# Patient Record
Sex: FEMALE | Race: WHITE | Employment: FULL TIME | URBAN - METROPOLITAN AREA
[De-identification: names, ages, dates, MRNs, and addresses within clinical notes are randomized per-mention and may not be internally consistent; named-entity substitution may affect disease eponyms.]

---

## 2016-12-21 LAB
CHLAMYDIA, EXTERNAL: NEGATIVE
HBSAG, EXTERNAL: NEGATIVE
HIV, EXTERNAL: NON REACTIVE
N. GONORRHEA, EXTERNAL: NEGATIVE
RUBELLA, EXTERNAL: NORMAL
TYPE, ABO & RH, EXTERNAL: NORMAL

## 2017-05-12 LAB
ANTIBODY SCREEN, EXTERNAL: NEGATIVE
T. PALLIDUM, EXTERNAL: NEGATIVE

## 2017-06-08 ENCOUNTER — HOSPITAL ENCOUNTER (OUTPATIENT)
Dept: ULTRASOUND IMAGING | Age: 37
Discharge: HOME | End: 2017-06-08
Attending: OBSTETRICS & GYNECOLOGY
Payer: COMMERCIAL

## 2017-06-08 DIAGNOSIS — O12.00 LEG SWELLING IN PREGNANCY: ICD-10-CM

## 2017-06-08 DIAGNOSIS — M79.605 BILATERAL LEG PAIN: ICD-10-CM

## 2017-06-08 DIAGNOSIS — M79.604 BILATERAL LEG PAIN: ICD-10-CM

## 2017-06-08 PROCEDURE — 93970 EXTREMITY STUDY: CPT

## 2017-06-15 ENCOUNTER — HOSPITAL ENCOUNTER (EMERGENCY)
Age: 37
Discharge: HOME | End: 2017-06-15
Attending: OBSTETRICS & GYNECOLOGY | Admitting: OBSTETRICS & GYNECOLOGY
Payer: COMMERCIAL

## 2017-06-15 ENCOUNTER — HOSPITAL ENCOUNTER (EMERGENCY)
Age: 37
End: 2017-06-15
Attending: OBSTETRICS & GYNECOLOGY | Admitting: OBSTETRICS & GYNECOLOGY
Payer: COMMERCIAL

## 2017-06-15 VITALS
DIASTOLIC BLOOD PRESSURE: 76 MMHG | TEMPERATURE: 98.5 F | WEIGHT: 173 LBS | OXYGEN SATURATION: 97 % | SYSTOLIC BLOOD PRESSURE: 123 MMHG | BODY MASS INDEX: 28.82 KG/M2 | HEART RATE: 117 BPM | HEIGHT: 65 IN | RESPIRATION RATE: 16 BRPM

## 2017-06-15 LAB
FIBRONECTIN FETAL VAG QL: NEGATIVE
GRBS, EXTERNAL: POSITIVE

## 2017-06-15 PROCEDURE — 96360 HYDRATION IV INFUSION INIT: CPT

## 2017-06-15 PROCEDURE — 99282 EMERGENCY DEPT VISIT SF MDM: CPT

## 2017-06-15 PROCEDURE — 74011250636 HC RX REV CODE- 250/636: Performed by: OBSTETRICS & GYNECOLOGY

## 2017-06-15 PROCEDURE — 82731 ASSAY OF FETAL FIBRONECTIN: CPT | Performed by: OBSTETRICS & GYNECOLOGY

## 2017-06-15 RX ADMIN — SODIUM CHLORIDE 1000 ML: 900 INJECTION, SOLUTION INTRAVENOUS at 16:27

## 2017-06-15 NOTE — PROGRESS NOTES
Pt ambulated off unit with FOB without complaint after reviewing discharge instructions.   VS stable

## 2017-06-15 NOTE — IP AVS SNAPSHOT
Current Discharge Medication List  
  
ASK your doctor about these medications Dose & Instructions Dispensing Information Comments Morning Noon Evening Bedtime PNV38-Iron Cbn&Gluc-FA-DSS-DHA 35-1- mg Cmpk Your last dose was: Your next dose is: Take  by mouth. Refills:  0

## 2017-06-15 NOTE — DISCHARGE INSTRUCTIONS
STAY HYDRATED  TYLENOL OK  FETAL FIBRONECTIN TEST WAS NEGATIVE; NOT LIKELY TO GO INTO LABOR WITHIN 2 WEEKS. CALL [facility] IF CONTRACTIONS INTENSIFY. SPOTTING IS NORMAL AFTER CERVICAL EXAM.     Weeks 32 to 34 of Your Pregnancy: Care Instructions  Your Care Instructions    During the last few weeks of your pregnancy, you may have more aches and pains. It's important to rest when you can. Your growing baby is putting more pressure on your bladder. So you may need to urinate more often. Hemorrhoids are also common. These are painful, itchy veins in the rectal area. In the 36th week, most women have a test for group B streptococcus (GBS). GBS is a common bacteria that can live in the vagina and rectum. It can make your baby sick after birth. If you test positive, you will get antibiotics during labor. These will keep your baby from getting the bacteria. You may want to talk with your doctor about banking your baby's umbilical cord blood. This is the blood left in the cord after birth. If you want to save this blood, you must arrange it ahead of time. You can't decide at the last minute. If you haven't already had the Tdap shot during this pregnancy, talk to your doctor about getting it. It will help protect your  against pertussis infection. Follow-up care is a key part of your treatment and safety. Be sure to make and go to all appointments, and call your doctor if you are having problems. It's also a good idea to know your test results and keep a list of the medicines you take. How can you care for yourself at home? Ease hemorrhoids  · Get more liquids, fruits, vegetables, and fiber in your diet. This will help keep your stools soft. · Avoid sitting for too long. Lie on your left side several times a day. · Clean yourself with soft, moist toilet paper. Or you can use witch hazel pads or personal hygiene pads. · If you are uncomfortable, try ice packs. Or you can sit in a warm sitz bath.  Do these for 20 minutes at a time, as needed. · Use hydrocortisone cream for pain and itching. Two examples are Anusol and Preparation H Hydrocortisone. · Ask your doctor about taking an over-the-counter stool softener. Consider breastfeeding  · Experts recommend that women breastfeed for 1 year or longer. Breast milk is the perfect food for babies. · Breast milk is easier for babies to digest than formula. And it is always available, just the right temperature, and free. · In general, babies who are  are healthier than formula-fed babies. ¨  babies are less likely to get ear infections, colds, diarrhea, and pneumonia. ¨  babies who are fed only breast milk are less likely to get asthma and allergies. ¨  babies are less likely to be obese. ¨  babies are less likely to get diabetes or heart disease. · Women who breastfeed have less bleeding after the birth. Their uteruses also shrink back faster. · Some women who breastfeed lose weight faster. Making milk burns calories. · Breastfeeding can lower your risk of breast cancer, ovarian cancer, and osteoporosis. Decide about circumcision for boys  · As you make this decision, it may help to think about your personal, Hindu, and family traditions. You get to decide if you will keep your son's penis natural or if he will be circumcised. · If you decide that you would like to have your baby circumcised, talk with your doctor. You can share your concerns about pain. And you can discuss your preferences for anesthesia. Where can you learn more? Go to http://oscar-omar.info/. Enter I065 in the search box to learn more about \"Weeks 32 to 34 of Your Pregnancy: Care Instructions. \"  Current as of: May 30, 2016  Content Version: 11.2  © 9656-2893 [employer]. Care instructions adapted under license by [employer] (which disclaims liability or warranty for this information).  If you have questions about a medical condition or this instruction, always ask your healthcare professional. [place] [address] any warranty or liability for your use of this information.

## 2017-06-15 NOTE — PROGRESS NOTES
Patient still feels some irregular contractions. No VB/LOF. Active FM. FHR- reactive  toco- irritability  Cervix- long/closed  FFN negative   33 3/7 weeks with threatened  labor.  No cervical change and FFN negative  -discharge home with PTL precautions  -increase hydration  -she will call with any regular contractions or other PTL sxs

## 2017-06-15 NOTE — IP AVS SNAPSHOT
[address] [facility] 
[phone] Patient: Liu Kim MRN: [id number] :1980 You are allergic to the following No active allergies Recent Documentation Height Weight BMI OB Status Smoking Status 1.651 m 78.5 kg 28.79 kg/m2 Pregnant Former Smoker Emergency Contacts Name Discharge Info Relation Home Work Mobile Adelfo Haney DISCHARGE CAREGIVER [3] Life Partner [7] [phone] About your hospitalization You were admitted on:  N/A You last received care in the:  [facility] 3 LD TRIAGE You were discharged on:  Natasha 15, 2017 Unit phone number:  [phone] Why you were hospitalized Your primary diagnosis was:  Not on File Providers Seen During Your Hospitalizations Provider Role Specialty Primary office phone Joan Mae MD Attending Provider Obstetrics & Gynecology [phone] Your Primary Care Physician (PCP) Primary Care Physician Office Phone Office Fax Lalit Casas [phone] Follow-up Information Follow up With Details [place]   [address] [address] 
[phone] Your Appointments   SECTION with Joan Mae MD  
[facility] 3 LABOR & DELIVERY ([address]) [address] [facility]  
[phone] Current Discharge Medication List  
  
ASK your doctor about these medications Dose & Instructions Dispensing Information Comments Morning Noon Evening Bedtime PNV38-Iron Cbn&Gluc-FA-DSS-DHA 35-1- mg Cmpk Your last dose was: Your next dose is: Take  by mouth. Refills:  0 Discharge Instructions STAY HYDRATED TYLENOL OK 
 FETAL FIBRONECTIN TEST WAS NEGATIVE; NOT LIKELY TO GO INTO LABOR WITHIN 2 WEEKS. CALL [facility] IF CONTRACTIONS INTENSIFY. SPOTTING IS NORMAL AFTER CERVICAL EXAM. Weeks 32 to 34 of Your Pregnancy: Care Instructions Your Care Instructions During the last few weeks of your pregnancy, you may have more aches and pains. It's important to rest when you can. Your growing baby is putting more pressure on your bladder. So you may need to urinate more often. Hemorrhoids are also common. These are painful, itchy veins in the rectal area. In the 36th week, most women have a test for group B streptococcus (GBS). GBS is a common bacteria that can live in the vagina and rectum. It can make your baby sick after birth. If you test positive, you will get antibiotics during labor. These will keep your baby from getting the bacteria. You may want to talk with your doctor about banking your baby's umbilical cord blood. This is the blood left in the cord after birth. If you want to save this blood, you must arrange it ahead of time. You can't decide at the last minute. If you haven't already had the Tdap shot during this pregnancy, talk to your doctor about getting it. It will help protect your  against pertussis infection. Follow-up care is a key part of your treatment and safety. Be sure to make and go to all appointments, and call your doctor if you are having problems. It's also a good idea to know your test results and keep a list of the medicines you take. How can you care for yourself at home? Ease hemorrhoids · Get more liquids, fruits, vegetables, and fiber in your diet. This will help keep your stools soft. · Avoid sitting for too long. Lie on your left side several times a day. · Clean yourself with soft, moist toilet paper. Or you can use witch hazel pads or personal hygiene pads. · If you are uncomfortable, try ice packs.  Or you can sit in a warm sitz bath. Do these for 20 minutes at a time, as needed. · Use hydrocortisone cream for pain and itching. Two examples are Anusol and Preparation H Hydrocortisone. · Ask your doctor about taking an over-the-counter stool softener. Consider breastfeeding · Experts recommend that women breastfeed for 1 year or longer. Breast milk is the perfect food for babies. · Breast milk is easier for babies to digest than formula. And it is always available, just the right temperature, and free. · In general, babies who are  are healthier than formula-fed babies. ¨  babies are less likely to get ear infections, colds, diarrhea, and pneumonia. ¨  babies who are fed only breast milk are less likely to get asthma and allergies. ¨  babies are less likely to be obese. ¨  babies are less likely to get diabetes or heart disease. · Women who breastfeed have less bleeding after the birth. Their uteruses also shrink back faster. · Some women who breastfeed lose weight faster. Making milk burns calories. · Breastfeeding can lower your risk of breast cancer, ovarian cancer, and osteoporosis. Decide about circumcision for boys · As you make this decision, it may help to think about your personal, Roman Catholic, and family traditions. You get to decide if you will keep your son's penis natural or if he will be circumcised. · If you decide that you would like to have your baby circumcised, talk with your doctor. You can share your concerns about pain. And you can discuss your preferences for anesthesia. Where can you learn more? Go to http://oscar-omar.info/. Enter Q744 in the search box to learn more about \"Weeks 32 to 34 of Your Pregnancy: Care Instructions. \" Current as of: May 30, 2016 Content Version: 11.2 © 0540-1832 [employer].  Care instructions adapted under license by [employer] (which disclaims liability or warranty for this information). If you have questions about a medical condition or this instruction, always ask your healthcare professional. [address] any warranty or liability for your use of this information. Discharge Orders None Introducing [facility] SERVICES! S[employer]chez [employer] introduces [employer] patient portal. Now you can access parts of your medical record, email your doctor's office, and request medication refills online. 1. In your internet browser, go to [email or web address] 2. Click on the First Time User? Click Here link in the Sign In box. You will see the New Member Sign Up page. 3. Enter your [employer] Access Code exactly as it appears below. You will not need to use this code after youve completed the sign-up process. If you do not sign up before the expiration date, you must request a new code. · [employer] Access Code: [email or web address] Expires: 7/22/2017  3:59 PM 
 
4. Enter the last four digits of your Social Security Number (xxxx) and Date of Birth (mm/dd/yyyy) as indicated and click Submit. You will be taken to the next sign-up page. 5. Create a [employer] ID. This will be your [employer] login ID and cannot be changed, so think of one that is secure and easy to remember. 6. Create a [employer] password. You can change your password at any time. 7. Enter your Password Reset Question and Answer. This can be used at a later time if you forget your password. 8. Enter your e-mail address. You will receive e-mail notification when new information is available in [address] 9. Click Sign Up. You can now view and download portions of your medical record. 10. Click the Download Summary menu link to download a portable copy of your medical information. If you have questions, please visit the Frequently Asked Questions section of the [employer] website. Remember, [employer] is NOT to be used for urgent needs. For medical emergencies, dial 911. Now available from your iPhone and Android! General Information Please provide this summary of care documentation to your next provider. Patient Signature:  ____________________________________________________________ Date:  ____________________________________________________________  
  
Carlena Godwin Provider Signature:  ____________________________________________________________ Date:  ____________________________________________________________

## 2017-06-15 NOTE — H&P
EDC:2017  EGA: 33 weeks, 3 days      Vital Signs   31Years Old Female  Weight: 174.3 pounds  BP:       122/74    Pap/HPV/Gardasil History   History of abnormal pap: yes  Gardasil Injection History: Not Applicable      Urinalysis         Results    Appearance:   Clear  Color:    Yellow  Leukocyte:   Negative  Nitrite:    Negative  Urobilinogen:   0.2  Protein:   N  pH:    6.0  Blood:    Trace  Specific Gravity:  1.005  Ketone:   2+  Bilirubin:   Negative  Glucose:   Negative          Test Performed By: Mendel Damian RN - Team 1 [facility] at Natasha 15, 2017 3:05 PM  Test Entered By: Mendel Damian RN - Team 1 [facility] at [place] 15, 2017 3:05 PM    Patient's Prenatal Care with Doctor of Record Narcisa Mendez MD Notable For -    Lower extremity edema, bilateral  Size greater than dates  LGA ____  Subchorionic hematoma or hemorrhage  Threatened   Previous  - type unknown, awaiting op report_____  Ovarian cyst pregnant RIGHT ; corpus luteum  Previous pregnancy w/ preeclampsia   lab screening  Normal pregnancy multigravida  Leiomyoma  Abdominal pain pregnant RLQ              Past Pregnancy History      :  2     Term Births:  0     Premature Births: 1     Living Children: 1     Para:   1     Prev : 1     Aborta:  0    Pregnancy # 1     Delivery date:   2005     Weeks Gestation: 36/6     Delivery type:   LTCS     Delivery location:   [facility]     Infant Sex:  Male     Birth weight:  6#15     Name:  Bruce Soler     Comments:  IOL for [address] [place] [address]. Intolerance to labor. Was told she will always need a CS in future. Emergency section for PreE. Unsure if on magnesium sulfate.  Op report reviewed - LTCS    Pregnancy # 2     Comments:  current        Allergies      Medications Removed from Medication List        [address] [place] [address] for Follow-up Visit     Estimated weeks of        gestation:  33 3/7     Weight:  174.3     Blood pressure: 122 / 74     Urine Protein:  N     Urine Glucose: Negative     Headache:  No Nausea/vomiting: nausea     Edema:  1+LE     Vaginal bleeding: no     Vaginal discharge: no     Fetal activity:  yes     Fundal height:    36     FHR:   nst     Labor symptoms: yes     Fetal position:  vertex     Cx Dilation:  0     Cx Effacement: 0%     Cx Station:  -3     Next visit:  as scheduled      Preceptor:  anita     Comment:  c/o frequent contractions 4-11 mins. no leakage or bleeding. NST today Reactive,   ctx every 4 minutes Palpate mild  FFN collected   GBS sent    UA  large ketones   to L&D for IVF hydration         Impression & Recommendations:    Problem # 1:  Threatened PTL (GXS-961.83) (WNB35-R93.03)  will send to L&D for monitoring and IV fludi hydration   FFN sent with patient   closed cervix   If concern for labor would give  steriods   Prior CS, scheduled for repeat at 39 weeks   spoke with charge nurse. prenatal sent     Orders:  Prenatal Problem Visit Level 3 (USG-84394)  UA in house done today, no micro (CPT-57486)  Specimen Handling (CPT-03107)  Group B Strep Culture ([id number])  Fetal non-stress test (CPT-85549X)      Fetal Surveillance      NST Fetus A  An NST was performed and was reactive. The baseline FHR was 145 and regular. Moderate variablity was present. Five accelerations of sufficient amplitude and duration were noted. There were no decelerations noted. contractions every 3-5 minutes . Medications (at conclusion of this visit)    2017 COLACE 100 MG ORAL CAPS (DOCUSATE SODIUM) 1 by mouth twice daily as needed for constipation  2017 FERROUS SULFATE 325 (65 FE) MG ORAL TABS (FERROUS SULFATE) 1 by mouth twice daily  2017 PERCOCET 5-325 MG ORAL TABS (OXYCODONE-ACETAMINOPHEN) 1 PO Q 6 hrs prn pain  2016 PNV-DHA CAPS (PRENAT W/O I-[id number]-FA-DHA CAPS)           Primary Provider:  Markus Cabral MD    CC:  contractions.     History of Present Illness:  presents to office with contractions every 4-10minutes started around 10am   'not too painful'  Active fetal movement  No complaint of vaginal bleeding. No leakage of fluid. Past Medical History:     Reviewed history from 2016 and no changes required:        Abnormal Pap Smear , normal f/u                    Past Surgical History:     Reviewed history from 2016 and no changes required:                 Appendectomy     Family History Summary:      Reviewed history Last on 2017 and no changes required:06/15/2017  Father (Holden Stager.) - Has Family History of Other Medical Problems - Entered On: 2016  Father (Holden Stager.) - Has Family History of Kidney/Renal Disease - failure - Entered On: 2016  PGM - Has Family History of Lung Cancer - Entered On: 2016  MGF - Has Family History of Colon Cancer - Entered On: 2016      Social History:     Reviewed history from 2016 and no changes required:                              Previous Tobacco Use: Signed On - 2016  Smoked Tobacco Use:  Former smoker     Cigarettes:  Yes  Smokeless Tobacco Use:  Never     Counseled to quit/cut down:  yes  Passive smoke exposure:  no  Drug use:  no  HIV high-risk behavior:  no  Caffeine use:  <1 drinks per day    Previous Alcohol Use: Signed On - 2016  Alcohol use:  no  Exercise:  no  Seatbelt use:  preg- %  Sun Exposure:  occasionally    Family History Risk Factors:     Family History of MI in females < 72years old:  no     Family History of MI in males < 54years old:  no    PAP Smear History:     Date of Last PAP Smear:  2016      Review of Systems        See HPI    Except as noted in the HPI, the review of systems is negative for General and .       Vital Signs    Blood Pressure: 122 / 76  FHT Descrip:    good BTBV       - Additional FHT Comments: category 1 fetal tracing   Contractions:  yes  UC Frequency:  q 3-4 min    UC Intensity:  mild   NST:   reactive     Weight:  174.3 pounds      Abdomen           Fundal Height: 36                  Dilation: : 0                  Effacement:  0%                  Station:  -3                  Presentation:  vertex    Allergies      Medications Removed from Medication List        Impression & Recommendations:    Problem # 1:  Threatened PTL (ICD-644.03) (YXF02-N13.03)  will send to L&D for monitoring and IV fludi hydration   FFN sent with patient   closed cervix   If concern for labor would give  steriods   Prior CS, scheduled for repeat at 39 weeks   spoke with charge nurse.   prenatal sent     Orders:  Prenatal Problem Visit Level 3 (DFA-17288)  UA in house done today, no micro (CPT-48983)  Specimen Handling (CPT-38144)  Group B Strep Culture ([id number])  Fetal non-stress test (CPT-38348T)        Medications (at conclusion of this visit)    2017 COLACE 100 MG ORAL CAPS (DOCUSATE SODIUM) 1 by mouth twice daily as needed for constipation  2017 FERROUS SULFATE 325 (65 FE) MG ORAL TABS (FERROUS SULFATE) 1 by mouth twice daily  2017 PERCOCET 5-325 MG ORAL TABS (OXYCODONE-ACETAMINOPHEN) 1 PO Q 6 hrs prn pain  2016 PNV-DHA CAPS (PRENAT W/O I-[id number]-FA-DHA CAPS)           LABORATORY DATA   TEST DATE RESULT   Group B Strep culture                                     (Group B Strep Culture Result Field)   Blood Type 2016 A                                             (Blood Type Result Field)   Rh 2016 Positive                                   (Rh Result Field)   Rhogam Inj Given     Tdap Vaccine Given 2017 declined   Antibody Screen 2017 Negative   Rubella  [facility] Reference Ranges On or After 3/10/14                  <0.90              Non-immune      0.90 - 0.99     Equivocal      >0.99              Immune    [facility] Reference Ranges  Before 3/10/14           <5                 Non-immune             5 - 9               Equivocal            >9                 Immune  [facility] Reference Ranges       < Or = 0.90       Negative 0. 91-1.09          Equivocal            > Or = 1.10       Positive   12/21/2016     1.00     TPA (T Pallidum Antibodies) 05/12/2017 Negative   Serology (RPR)     HBsAg 12/21/2016 Negative   HIV 12/21/2016 Non Reactive   Hemoglobin 05/12/2017 8.9   Hematocrit 05/12/2017 25.7   Platelets 85/67/0919 202 X10E3/UL   TSH     Urine Culture 12/21/2016 Negative   GC DNA Probe 01/19/2017 Negative   Chlamydia DNA 01/19/2017 Negative   PAP 09/17/2016 NIL   Flu Vaccine Given 12/06/2016 declined   HGBA1C     HGB Electro     T4, Free     BG Fasting     GTT 1H 50G 05/12/2017 139   GTT 1H 100G     GTT 2H 100G     GTT 3H 100G     Glucose Plasma     CF Accept or Decline     CF Screen Result 12/21/2016 Declined   Nuchal Trans 03/16/2017 4.06^4. 06 mm&millimeters   AFP Only     Tetra 02/10/2017 Declined   AFP Serum     CVS 12/21/2016 declined   AFP Amniotic     Amnio Karyo 12/21/2016 declined   FISH     GC Culture     Chlamydia Cult     Ureaplasma     Mycoplasma     WBC 01/31/2017 12.7 X10E3/UL   RBC 01/31/2017 3.52 X10E6/UL   MCV 01/31/2017 93   MCH 01/31/2017 31.8   MCHC RBC 01/31/2017 34.4     ULTRASOUND DATA   TEST DATE RESULT   Estimated Fetal Weight 06/08/2017 2296.53985396^2297 g&grams                                     Weight % 06/08/2017 83^83% %&percent                                                MARCOS 06/08/2017 14.47^14. 5 cm&centimeters                    BPP 06/08/2017 8^8 [n/a]&Not applicable   Cervical Length (mm)       ]  Date of Surgery Update:  Maryam Medina was seen and examined. History and physical has been reviewed. The patient has been examined.  There have been no significant clinical changes since the completion of the originally dated History and Physical.    Signed By: Sherice Henderson MD     Natasha 15, 2017 5:37 PM

## 2017-06-15 NOTE — PROGRESS NOTES
previous cs pt admitted from office for rule out PTL vs dehydration. Pt states +fm, denies LOF and VB. Consent obtained and efm applied.

## 2017-06-22 ENCOUNTER — HOSPITAL ENCOUNTER (OUTPATIENT)
Age: 37
Setting detail: OBSERVATION
Discharge: HOME | End: 2017-06-22
Attending: OBSTETRICS & GYNECOLOGY | Admitting: OBSTETRICS & GYNECOLOGY
Payer: COMMERCIAL

## 2017-06-22 VITALS
SYSTOLIC BLOOD PRESSURE: 121 MMHG | OXYGEN SATURATION: 99 % | TEMPERATURE: 98 F | DIASTOLIC BLOOD PRESSURE: 69 MMHG | HEART RATE: 75 BPM | RESPIRATION RATE: 18 BRPM | HEIGHT: 65 IN | BODY MASS INDEX: 29.36 KG/M2 | WEIGHT: 176.19 LBS

## 2017-06-22 LAB — FIBRONECTIN FETAL VAG QL: NEGATIVE

## 2017-06-22 PROCEDURE — 59025 FETAL NON-STRESS TEST: CPT

## 2017-06-22 PROCEDURE — 99218 HC RM OBSERVATION: CPT

## 2017-06-22 PROCEDURE — 74011250636 HC RX REV CODE- 250/636: Performed by: OBSTETRICS & GYNECOLOGY

## 2017-06-22 PROCEDURE — 96360 HYDRATION IV INFUSION INIT: CPT

## 2017-06-22 PROCEDURE — 82731 ASSAY OF FETAL FIBRONECTIN: CPT | Performed by: OBSTETRICS & GYNECOLOGY

## 2017-06-22 RX ORDER — LANOLIN ALCOHOL/MO/W.PET/CERES
325 CREAM (GRAM) TOPICAL 2 TIMES DAILY
Status: ON HOLD | COMMUNITY
End: 2017-07-10

## 2017-06-22 RX ORDER — DOCUSATE SODIUM 100 MG/1
100 CAPSULE, LIQUID FILLED ORAL
Status: ON HOLD | COMMUNITY
End: 2017-07-10

## 2017-06-22 RX ORDER — OXYCODONE AND ACETAMINOPHEN 5; 325 MG/1; MG/1
1 TABLET ORAL
Status: ON HOLD | COMMUNITY
End: 2017-07-10

## 2017-06-22 RX ORDER — SODIUM CHLORIDE, SODIUM LACTATE, POTASSIUM CHLORIDE, CALCIUM CHLORIDE 600; 310; 30; 20 MG/100ML; MG/100ML; MG/100ML; MG/100ML
999 INJECTION, SOLUTION INTRAVENOUS CONTINUOUS
Status: DISPENSED | OUTPATIENT
Start: 2017-06-22 | End: 2017-06-22

## 2017-06-22 RX ADMIN — SODIUM CHLORIDE, SODIUM LACTATE, POTASSIUM CHLORIDE, AND CALCIUM CHLORIDE 999 ML/HR: 600; 310; 30; 20 INJECTION, SOLUTION INTRAVENOUS at 13:20

## 2017-06-22 NOTE — PROGRESS NOTES
Dr Agapito Mccullough at bedside; discussed pt concerns with patient; reviewed strip; discussed negative FFN; to discharge patient; pt to keep previously scheduled appointment with Dr Martín Dukes on June 29. Reviewed dc instructions with patient; pt verbalized understanding; signed electronically; will give paper copy to patient to take home.     4967 pt given paper copy of dc instructions; pt denies questions; ambulated off unit with all belongings; declines wc

## 2017-06-22 NOTE — PROGRESS NOTES
Prior notification of patient's arrival for Obstetric evaluation  From Baltazar lloyd. Receive pt ambulatory and admit to  Triage 1 . Patient given  a specimen cup along with instructions for clean catch U/A and to change in to a pt gown. Prenatal medications and allergies reviewed    Routine L&D triage  procedures explained including how to contact nurse,  and to call for nursing assistance prior to getting OOB to use bathroom. Call bell within reach. External fetal monitor and toco applied,FHT's:135  Patient states that she feels the baby move. Uterine contractions are palpating mild to moderate intensity every 8 minutes. She is a  A0 L1, EDC: 17, 35 0/7 weeks    She describes a benign prenatal course except for: having a previous C/Section  The patient presents today with complains of:  uterine contractions, back pain, since this morning. Also describes urinary urgency/frequency   She denies: CHTN,  gestational diabetes, vaginal discharge, bleeding,elevated blood pressure, headache,dizziness, visual changes,epigastric pain, edema, DM, nausea,vomiting, diarrhea, shortness of breath,chest pain, cough,urinary , dysuria,  fever/chills, or flank pain. Numeric pain scale explained to patient, pain scale rating; 6 /10 with a contraction and 3/10 with out a contraction.  notified of patient's arrival and initial assessment. Pt just remembered that she brought a ffN with her from the office. 1300 fFN negative, Dr Melissa Kelley notified.

## 2017-06-22 NOTE — IP AVS SNAPSHOT
[address] [facility] 
[phone] Patient: Inocencio Maxwell MRN: [id number] :1980 You are allergic to the following No active allergies Recent Documentation Height Weight BMI OB Status Smoking Status 1.651 m 79.9 kg 29.32 kg/m2 Pregnant Former Smoker Emergency Contacts Name Discharge Info Relation Home Work Mobile Adelfo Haney DISCHARGE CAREGIVER [3] Life Partner [7] [phone] About your hospitalization You were admitted on:  2017 You last received care in the:  [facility] 3 LD TRIAGE You were discharged on:  2017 Unit phone number:  [phone] Why you were hospitalized Your primary diagnosis was:  Not on File Providers Seen During Your Hospitalizations Provider Role Specialty Primary office phone Uvaldo Cushing, MD Attending Provider Obstetrics & Gynecology [phone] Your Primary Care Physician (PCP) Primary Care Physician Office Phone Office Fax Anna Dwyerigor [phone] Follow-up Information None Your Appointments   SECTION with Uvaldo Cushing, MD  
[facility] 3 LABOR & DELIVERY ([address]) [address] [facility]  
[phone] Current Discharge Medication List  
  
ASK your doctor about these medications Dose & Instructions Dispensing Information Comments Morning Noon Evening Bedtime PNV38-Iron Cbn&Gluc-FA-DSS-DHA 35-1- mg Cmpk Your last dose was: Your next dose is: Take  by mouth. Refills:  0 Discharge Instructions Keep your scheduled appointment with Dr Bubba Watkins that is scheduled for 2017.  Labor: Care Instructions Your Care Instructions  labor is the start of labor between 20 and 36 weeks of pregnancy. A full-term pregnancy lasts 37 to 42 weeks. In labor, the uterus contracts to open the cervix. This is the first stage of childbirth.  labor can be caused by a problem with the baby, the mother, or both. Often the cause is not known. In some cases, doctors use medicines to try to delay labor until 29 or more weeks of pregnancy. By this time, a baby has grown enough so that problems are not likely. In some casessuch as with a serious infectionit is healthier for the baby to be born early. Your treatment will depend on how far along you are in your pregnancy and on your health and your baby's health. Follow-up care is a key part of your treatment and safety. Be sure to make and go to all appointments, and call your doctor if you are having problems. It's also a good idea to know your test results and keep a list of the medicines you take. How can you care for yourself at home? · If your doctor prescribed medicines, take them exactly as directed. Call your doctor if you think you are having a problem with your medicine. · Rest until your doctor advises you about activity. He or she will tell you if you should stay in bed most of the time. You may need to arrange for  if you have young children. · Use pads, not tampons, if you have vaginal bleeding. It is normal to have a small amount of vaginal spotting due to the cervical checks today. · Make sure to drink plenty of fluids. Dehydration can lead to contractions. If you have kidney, heart, or liver disease and have to limit fluids, talk with your doctor before you increase the amount of fluids you drink. · Do not smoke or allow others to smoke around you. If you need help quitting, talk to your doctor about stop-smoking programs and medicines. These can increase your chances of quitting for good.  
· You may soak in a warm bath or use a heating pad with caution if that helps you relax. You may also take Tylenol for discomfort of Torito Morejon Contractions; You should also drink at least 8 8oz glasses of water each day. When should you call for help? Call 911 anytime you think you may need emergency care. For example, call if: 
· You passed out (lost consciousness). · You have severe vaginal bleeding. · You have severe pain in your belly or pelvis. · You have had fluid gushing or leaking from your vagina and you know or think the umbilical cord is bulging into your vagina. If this happens, immediately get down on your knees so your rear end (buttocks) is higher than your head. This will decrease the pressure on the cord until help arrives. Call your doctor now or seek immediate medical care if: 
· You have signs of preeclampsia, such as: 
¨ Sudden swelling of your face, hands, or feet. ¨ New vision problems (such as dimness or blurring). ¨ A severe headache. · You have any vaginal bleeding. · You have belly pain or cramping. · You have a fever. · You have had regular contractions (with or without pain) for an hour. This means that you have 6 or more within 1 hour after you change your position and drink fluids. · You have a sudden release of fluid from the vagina. · You have low back pain or pelvic pressure that does not go away. · You notice that your baby has stopped moving or is moving much less than normal. 
Watch closely for changes in your health, and be sure to contact your doctor if you have any problems. Where can you learn more? Go to http://oscar-omar.info/. Enter Q400 in the search box to learn more about \" Labor: Care Instructions. \" Current as of: 2017 Content Version: 11.3 © 0099-4002 [employer]. Care instructions adapted under license by [employer] (which disclaims liability or warranty for this information).  If you have questions about a medical condition or this instruction, always ask your healthcare professional. [place] [address] any warranty or liability for your use of this information. Weeks 34 to 36 of Your Pregnancy: Care Instructions Your Care Instructions By now, your baby and your belly have grown quite large. It is almost time to give birth. A full-term pregnancy can deliver between 37 and 42 weeks. Your baby's lungs are almost ready to breathe air. The bones in your baby's head are now firm enough to protect it, but soft enough to move down through the birth canal. 
You may feel excited, happy, anxious, or scared. You may wonder how you will know if you are in labor or what to expect during labor. Try to be flexible in your expectations of the birth. Because each birth is different, there is no way to know exactly what childbirth will be like for you. This care sheet will help you know what to expect and how to prepare. This may make your childbirth easier. If you haven't already had the Tdap shot during this pregnancy, talk to your doctor about getting it. It will help protect your  against pertussis infection. In the 36th week, most women have a test for group B streptococcus (GBS). GBS is a common bacteria that can live in the vagina and rectum. It can make your baby sick after birth. If you test positive, you will get antibiotics during labor. The medicine will keep your baby from getting the bacteria. Follow-up care is a key part of your treatment and safety. Be sure to make and go to all appointments, and call your doctor if you are having problems. It's also a good idea to know your test results and keep a list of the medicines you take. How can you care for yourself at home? Learn about pain relief choices · Pain is different for every woman. Talk with your doctor about your feelings about pain. · You can choose from several types of pain relief.  These include medicine or breathing techniques, as well as comfort measures. You can use more than one option. · If you choose to have pain medicine during labor, talk to your doctor about your options. Some medicines lower anxiety and help with some of the pain. Others make your lower body numb so that you won't feel pain. · Be sure to tell your doctor about your pain medicine choice before you start labor or very early in your labor. You may be able to change your mind as labor progresses. · Rarely, a woman is put to sleep by medicine given through a mask or an IV. Labor and delivery · The first stage of labor has three parts: early, active, and transition. ¨ Most women have early labor at home. You can stay busy or rest, eat light snacks, drink clear fluids, and start counting contractions. ¨ When talking during a contraction gets hard, you may be moving to active labor. During active labor, you should head for the hospital if you are not there already. ¨ You are in active labor when contractions come every 3 to 4 minutes and last about 60 seconds. Your cervix is opening more rapidly. ¨ If your water breaks, contractions will come faster and stronger. ¨ During transition, your cervix is stretching, and contractions are coming more rapidly. ¨ You may want to push, but your cervix might not be ready. Your doctor will tell you when to push. · The second stage starts when your cervix is completely opened and you are ready to push. ¨ Contractions are very strong to push the baby down the birth canal. 
¨ You will feel the urge to push. You may feel like you need to have a bowel movement. ¨ You may be coached to push with contractions. These contractions will be very strong, but you will not have them as often. You can get a little rest between contractions. ¨ You may be emotional and irritable. You may not be aware of what is going on around you. ¨ One last push, and your baby is born. · The third stage is when a few more contractions push out the placenta. This may take 30 minutes or less. · The fourth stage is the welcome recovery. You may feel overwhelmed with emotions and exhausted but alert. This is a good time to start breastfeeding. Where can you learn more? Go to http://oscar-omar.info/. Enter W569 in the search box to learn more about \"Weeks 34 to 36 of Your Pregnancy: Care Instructions. \" Current as of: March 16, 2017 Content Version: 11.3 © 8878-9754 [employer]. Care instructions adapted under license by [employer] (which disclaims liability or warranty for this information). If you have questions about a medical condition or this instruction, always ask your healthcare professional. [address] any warranty or liability for your use of this information. Discharge Orders None Introducing [facility]! Janes Lopez introduces [employer]erkad[employer] patient portal. Now you can access parts of your medical record, email your doctor's office, and request medication refills online. 1. In your internet browser, go to [email or web address] 2. Click on the First Time User? Click Here link in the Sign In box. You will see the New Member Sign Up page. 3. Enter your Werkadoo Access Code exactly as it appears below. You will not need to use this code after youve completed the sign-up process. If you do not sign up before the expiration date, you must request a new code. · Werk[employer]oo Access Code: [email or web address] Expires: 7/22/2017  3:59 PM 
 
4. Enter the last four digits of your Social Security Number (xxxx) and Date of Birth (mm/dd/yyyy) as indicated and click Submit. You will be taken to the next sign-up page. 5. Create a Werkadoo ID. This will be your Werkadoo login ID and cannot be changed, so think of one that is secure and easy to remember. 6. Create a B[employer] password. You can change your password at any time. 7. Enter your Password Reset Question and Answer. This can be used at a later time if you forget your password. 8. Enter your e-mail address. You will receive e-mail notification when new information is available in [address] 9. Click Sign Up. You can now view and download portions of your medical record. 10. Click the Download Summary menu link to download a portable copy of your medical information. If you have questions, please visit the Frequently Asked Questions section of the [employer] website. Remember, Baitianshi is NOT to be used for urgent needs. For medical emergencies, dial 911. Now available from your iPhone and Android! General Information Please provide this summary of care documentation to your next provider. Patient Signature:  ____________________________________________________________ Date:  ____________________________________________________________  
  
[facility] Provider Signature:  ____________________________________________________________ Date:  ____________________________________________________________

## 2017-06-22 NOTE — H&P
HPI:  Pt at 35 wks with increased ctx today. Feels them approx q 8 min. No lof. No vb. Good FM. Previous c-s due to NRFHT during IOL for pre-e. EDC:2017  EGA: 34 weeks, 3 days      Vital Signs   31Years Old Female  Weight: 176 pounds  BP:       144/68    Pap/HPV/Gardasil History   History of abnormal pap: yes  Gardasil Injection History: Not Applicable    Patient's Prenatal Care with Doctor of Record Corinna King MD Notable For -    GBS positive RX in labor  Threatened PTL  Lower extremity edema, bilateral  Size greater than dates  LGA ____  Subchorionic hematoma or hemorrhage  Threatened   Previous  - type unknown, awaiting op report_____  Ovarian cyst pregnant RIGHT ; corpus luteum  Previous pregnancy w/ preeclampsia   lab screening  Normal pregnancy multigravida  Leiomyoma  Abdominal pain pregnant RLQ                  Allergies    This patient has no known allergies. Medications Removed from Medication List        [address] for Follow-up Visit     Estimated weeks of        gestation:  34 3/7     Weight:  176     Blood pressure: 144 / 68     Headache:  No     Nausea/vomiting: nausea     Edema:  yes     Vaginal bleeding: no     Vaginal discharge: no     Fetal activity:  dec     FHR:   156     Labor symptoms: yes     Cx Dilation:  0     Cx Effacement: 0%     Cx Station:  -3     Next visit:  as scheduled     Preceptor:  axel     Comment:  Unable to leave urine sample. NST reactive, ctx q2-7mins, palpated mild. Cervix posterior, unchanged. FFN collected and sent to L&D with patient in case they want to send it. To L&D for further evaluation. Fetal Surveillance      NST Fetus A  An NST was performed and was reactive. The baseline FHR was 150 and regular. Moderate variablity was present. Five accelerations of sufficient amplitude and duration were noted. There were no decelerations noted. ctx q2-7 minutes.        Impression & Recommendations:    Problem # 1: Threatened PTL (ICD-644.03) (FGO77-N95.03)  NST reactive. Ctx q2-7 minutes, palpate mild. Cervix unchanged, posterior. FFN collected and sent with patient in the event they choose to send it. To L&D for further evaluation. Orders:  Fetal non-stress test ([id number])  Antepartum Care (CPT-10)  Patient Sent to L&D (CPT-LD)  Sent to L&D  (CPT-AdmitF)        Medications (at conclusion of this visit)    2017 COLACE 100 MG ORAL CAPS (DOCUSATE SODIUM) 1 by mouth twice daily as needed for constipation  2017 FERROUS SULFATE 325 (65 FE) MG ORAL TABS (FERROUS SULFATE) 1 by mouth twice daily  2017 PERCOCET 5-325 MG ORAL TABS (OXYCODONE-ACETAMINOPHEN) 1 PO Q 6 hrs prn pain  2016 PNV-DHA CAPS (PRENAT W/O [id number]-FA-DHA CAPS)           Primary Provider:  Bob Don MD    CC:  r/o labor. History of Present Illness:  Complains of decreased movement - has not felt baby move today. Complaining of contractions every 6 minutes - take her breath away, has to stop in her tracks. Also complaining of an increase in pelvic pressure. Contractions and pelvic pressure started this morning. Denies leaking fluid. States she has been adequately hydrating with water. Patient states these contractions are worse than last week. Was seen on L&D last week for same thing - IV hydration, negative FFN. Cervix long and closed at discharge.         Past Medical History:     Reviewed history from 2016 and no changes required:        Abnormal Pap Smear , normal f/u                    Past Surgical History:     Reviewed history from 2016 and no changes required:                 Appendectomy     Family History Summary:      Reviewed history Last on 06/15/2017 and no changes required:2017  Father Monique Miller.) - Has Family History of Other Medical Problems - Entered On: 2016  Father Monique Miller.) - Has Family History of Kidney/Renal Disease - failure - Entered On: 2016  PGM - Has Family History of Lung Cancer - Entered On: 12/6/2016  MGF - Has Family History of Colon Cancer - Entered On: 12/6/2016      Social History:     Reviewed history from 12/06/2016 and no changes required:                                Review of Systems        See HPI    Except as noted in the HPI, the review of systems is negative for General and . Vital Signs    Blood Pressure: 144 / 68  NST:   reactive     Weight:  176 pounds      Physical Exam     General           General appearance:  no acute distress                  Dilation: : 0                  Effacement:  0%                  Station:  -3    Allergies    This patient has no known allergies. Medications Removed from Medication List        Impression & Recommendations:    Problem # 1:  Threatened PTL (ICD-644.03) (CPW10-Q15.03)  NST reactive. Ctx q2-7 minutes, palpate mild. Cervix unchanged, posterior. FFN collected and sent with patient in the event they choose to send it. To L&D for further evaluation.     Orders:  Fetal non-stress test ([id number])  Antepartum Care (CPT-10)  Patient Sent to L&D (CPT-LD)  Sent to L&D  (CPT-AdmitF)        Medications (at conclusion of this visit)    05/18/2017 COLACE 100 MG ORAL CAPS (DOCUSATE SODIUM) 1 by mouth twice daily as needed for constipation  05/18/2017 FERROUS SULFATE 325 (65 FE) MG ORAL TABS (FERROUS SULFATE) 1 by mouth twice daily  01/31/2017 PERCOCET 5-325 MG ORAL TABS (OXYCODONE-ACETAMINOPHEN) 1 PO Q 6 hrs prn pain  12/21/2016 PNV-DHA CAPS (PRENAT W/O [id number]-FA-DHA CAPS)           LABORATORY DATA   TEST DATE RESULT   Group B Strep culture 06/15/2017 Positive                                   (Group B Strep Culture Result Field)   Blood Type 12/21/2016 A                                             (Blood Type Result Field)   Rh 12/21/2016 Positive                                   (Rh Result Field)   Rhogam Inj Given     Tdap Vaccine Given 05/12/2017 declined   Antibody Screen 05/12/2017 Negative   Rubella  [facility] Reference Ranges On or After 3/10/14                  <0.90              Non-immune      0.90 - 0.99     Equivocal      >0.99              Immune    [facility] Reference Ranges  Before 3/10/14           <5                 Non-immune             5 - 9               Equivocal            >9                 Immune  [facility] Reference Ranges       < Or = 0.90       Negative             0.91-1.09          Equivocal            > Or = 1.10       Positive   12/21/2016     1.00     TPA (T Pallidum Antibodies) 05/12/2017 Negative   Serology (RPR)     HBsAg 12/21/2016 Negative   HIV 12/21/2016 Non Reactive   Hemoglobin 05/12/2017 8.9   Hematocrit 05/12/2017 25.7   Platelets 28/41/9150 202 X10E3/UL   TSH     Urine Culture 12/21/2016 Negative   GC DNA Probe 01/19/2017 Negative   Chlamydia DNA 01/19/2017 Negative   PAP 09/17/2016 NIL   Flu Vaccine Given 12/06/2016 declined   HGBA1C     HGB Electro     T4, Free     BG Fasting     GTT 1H 50G 05/12/2017 139   GTT 1H 100G     GTT 2H 100G     GTT 3H 100G     Glucose Plasma     CF Accept or Decline     CF Screen Result 12/21/2016 Declined   Nuchal Trans 03/16/2017 4.06^4. 06 mm&millimeters   AFP Only     Tetra 02/10/2017 Declined   AFP Serum     CVS 12/21/2016 declined   AFP Amniotic     Amnio Karyo 12/21/2016 declined   FISH     GC Culture     Chlamydia Cult     Ureaplasma     Mycoplasma     WBC 01/31/2017 12.7 X10E3/UL   RBC 01/31/2017 3.52 X10E6/UL   MCV 01/31/2017 93   MCH 01/31/2017 31.8   MCHC RBC 01/31/2017 34.4     ULTRASOUND DATA   TEST DATE RESULT   Estimated Fetal Weight 06/08/2017 2296.39975456^2297 g&grams                                     Weight % 06/08/2017 83^83% %&percent                                                MARCOS 06/08/2017 14.47^14. 5 cm&centimeters                    BPP 06/08/2017 8^8 [n/a]&Not applicable   Cervical Length (mm)       ]    Irregular ctx on monitor. No change in cervix.   Closed, thick but fetal head at -2 station likely causing some increased discomfort. FFN negative today.  labor precautions reviewed.     ________________________________________________________________________

## 2017-06-22 NOTE — DISCHARGE INSTRUCTIONS
Keep your scheduled appointment with Dr Jenn Bain that is scheduled for 2017.  Labor: Care Instructions  Your Care Instructions   labor is the start of labor between 21 and 36 weeks of pregnancy. A full-term pregnancy lasts 37 to 42 weeks. In labor, the uterus contracts to open the cervix. This is the first stage of childbirth.  labor can be caused by a problem with the baby, the mother, or both. Often the cause is not known. In some cases, doctors use medicines to try to delay labor until 29 or more weeks of pregnancy. By this time, a baby has grown enough so that problems are not likely. In some cases--such as with a serious infection--it is healthier for the baby to be born early. Your treatment will depend on how far along you are in your pregnancy and on your health and your baby's health. Follow-up care is a key part of your treatment and safety. Be sure to make and go to all appointments, and call your doctor if you are having problems. It's also a good idea to know your test results and keep a list of the medicines you take. How can you care for yourself at home? · If your doctor prescribed medicines, take them exactly as directed. Call your doctor if you think you are having a problem with your medicine. · Rest until your doctor advises you about activity. He or she will tell you if you should stay in bed most of the time. You may need to arrange for  if you have young children. · Use pads, not tampons, if you have vaginal bleeding. It is normal to have a small amount of vaginal spotting due to the cervical checks today. · Make sure to drink plenty of fluids. Dehydration can lead to contractions. If you have kidney, heart, or liver disease and have to limit fluids, talk with your doctor before you increase the amount of fluids you drink. · Do not smoke or allow others to smoke around you.  If you need help quitting, talk to your doctor about stop-smoking programs and medicines. These can increase your chances of quitting for good. · You may soak in a warm bath or use a heating pad with caution if that helps you relax. You may also take Tylenol for discomfort of [place] Contractions; You should also drink at least 8 8oz glasses of water each day. When should you call for help? Call 911 anytime you think you may need emergency care. For example, call if:  · You passed out (lost consciousness). · You have severe vaginal bleeding. · You have severe pain in your belly or pelvis. · You have had fluid gushing or leaking from your vagina and you know or think the umbilical cord is bulging into your vagina. If this happens, immediately get down on your knees so your rear end (buttocks) is higher than your head. This will decrease the pressure on the cord until help arrives. Call your doctor now or seek immediate medical care if:  · You have signs of preeclampsia, such as:  ¨ Sudden swelling of your face, hands, or feet. ¨ New vision problems (such as dimness or blurring). ¨ A severe headache. · You have any vaginal bleeding. · You have belly pain or cramping. · You have a fever. · You have had regular contractions (with or without pain) for an hour. This means that you have 6 or more within 1 hour after you change your position and drink fluids. · You have a sudden release of fluid from the vagina. · You have low back pain or pelvic pressure that does not go away. · You notice that your baby has stopped moving or is moving much less than normal.  Watch closely for changes in your health, and be sure to contact your doctor if you have any problems. Where can you learn more? Go to http://oscar-omar.info/. Enter Q400 in the search box to learn more about \" Labor: Care Instructions. \"  Current as of: 2017  Content Version: 11.3  © 4001-6771 [employer].  Care instructions adapted under license by [employer] (which disclaims liability or warranty for this information). If you have questions about a medical condition or this instruction, always ask your healthcare professional. [address] any warranty or liability for your use of this information. Weeks 34 to 36 of Your Pregnancy: Care Instructions  Your Care Instructions    By now, your baby and your belly have grown quite large. It is almost time to give birth. A full-term pregnancy can deliver between 37 and 42 weeks. Your baby's lungs are almost ready to breathe air. The bones in your baby's head are now firm enough to protect it, but soft enough to move down through the birth canal.  You may feel excited, happy, anxious, or scared. You may wonder how you will know if you are in labor or what to expect during labor. Try to be flexible in your expectations of the birth. Because each birth is different, there is no way to know exactly what childbirth will be like for you. This care sheet will help you know what to expect and how to prepare. This may make your childbirth easier. If you haven't already had the Tdap shot during this pregnancy, talk to your doctor about getting it. It will help protect your  against pertussis infection. In the 36th week, most women have a test for group B streptococcus (GBS). GBS is a common bacteria that can live in the vagina and rectum. It can make your baby sick after birth. If you test positive, you will get antibiotics during labor. The medicine will keep your baby from getting the bacteria. Follow-up care is a key part of your treatment and safety. Be sure to make and go to all appointments, and call your doctor if you are having problems. It's also a good idea to know your test results and keep a list of the medicines you take. How can you care for yourself at home? Learn about pain relief choices  · Pain is different for every woman.  Talk with your doctor about your feelings about pain. · You can choose from several types of pain relief. These include medicine or breathing techniques, as well as comfort measures. You can use more than one option. · If you choose to have pain medicine during labor, talk to your doctor about your options. Some medicines lower anxiety and help with some of the pain. Others make your lower body numb so that you won't feel pain. · Be sure to tell your doctor about your pain medicine choice before you start labor or very early in your labor. You may be able to change your mind as labor progresses. · Rarely, a woman is put to sleep by medicine given through a mask or an IV. Labor and delivery  · The first stage of labor has three parts: early, active, and transition. ¨ Most women have early labor at home. You can stay busy or rest, eat light snacks, drink clear fluids, and start counting contractions. ¨ When talking during a contraction gets hard, you may be moving to active labor. During active labor, you should head for the hospital if you are not there already. ¨ You are in active labor when contractions come every 3 to 4 minutes and last about 60 seconds. Your cervix is opening more rapidly. ¨ If your water breaks, contractions will come faster and stronger. ¨ During transition, your cervix is stretching, and contractions are coming more rapidly. ¨ You may want to push, but your cervix might not be ready. Your doctor will tell you when to push. · The second stage starts when your cervix is completely opened and you are ready to push. ¨ Contractions are very strong to push the baby down the birth canal.  ¨ You will feel the urge to push. You may feel like you need to have a bowel movement. ¨ You may be coached to push with contractions. These contractions will be very strong, but you will not have them as often. You can get a little rest between contractions. ¨ You may be emotional and irritable.  You may not be aware of what is going on around you. ¨ One last push, and your baby is born. · The third stage is when a few more contractions push out the placenta. This may take 30 minutes or less. · The fourth stage is the welcome recovery. You may feel overwhelmed with emotions and exhausted but alert. This is a good time to start breastfeeding. Where can you learn more? Go to http://oscar-omar.info/. Enter S409 in the search box to learn more about \"Weeks 34 to 36 of Your Pregnancy: Care Instructions. \"  Current as of: March 16, 2017  Content Version: 11.3  © 7635-0665 [employer]. Care instructions adapted under license by [employer] (which disclaims liability or warranty for this information). If you have questions about a medical condition or this instruction, always ask your healthcare professional. [address] any warranty or liability for your use of this information.

## 2017-06-29 ENCOUNTER — HOSPITAL ENCOUNTER (OUTPATIENT)
Age: 37
Discharge: HOME | End: 2017-06-29
Attending: OBSTETRICS & GYNECOLOGY | Admitting: OBSTETRICS & GYNECOLOGY
Payer: COMMERCIAL

## 2017-06-29 ENCOUNTER — APPOINTMENT (OUTPATIENT)
Dept: ULTRASOUND IMAGING | Age: 37
End: 2017-06-29
Attending: OBSTETRICS & GYNECOLOGY
Payer: COMMERCIAL

## 2017-06-29 VITALS
OXYGEN SATURATION: 93 % | BODY MASS INDEX: 29.82 KG/M2 | DIASTOLIC BLOOD PRESSURE: 87 MMHG | HEIGHT: 65 IN | TEMPERATURE: 98.2 F | HEART RATE: 104 BPM | SYSTOLIC BLOOD PRESSURE: 137 MMHG | WEIGHT: 179 LBS

## 2017-06-29 DIAGNOSIS — R60.9 EDEMA: ICD-10-CM

## 2017-06-29 DIAGNOSIS — R60.0 EDEMA, LEG: ICD-10-CM

## 2017-06-29 PROBLEM — Z34.90 PREGNANCY: Status: ACTIVE | Noted: 2017-06-29

## 2017-06-29 PROCEDURE — 99282 EMERGENCY DEPT VISIT SF MDM: CPT

## 2017-06-29 PROCEDURE — 59025 FETAL NON-STRESS TEST: CPT

## 2017-06-29 PROCEDURE — 99218 HC RM OBSERVATION: CPT

## 2017-06-29 PROCEDURE — 93970 EXTREMITY STUDY: CPT

## 2017-06-29 NOTE — IP AVS SNAPSHOT
[address] [facility] 
[phone] Patient: Tal Zuñiga MRN: [id number] :1980 You are allergic to the following No active allergies Recent Documentation Height Weight BMI OB Status Smoking Status 1.651 m 81.2 kg 29.79 kg/m2 Pregnant Former Smoker Emergency Contacts Name Discharge Info Relation Home Work Mobile Adelfo Haney DISCHARGE CAREGIVER [3] Life Partner [7] [phone] About your hospitalization You were admitted on:  2017 You last received care in the:  [facility] 3 LD TRIAGE You were discharged on:  2017 Unit phone number:  [phone] Why you were hospitalized Your primary diagnosis was:  Not on File Your diagnoses also included:  Pregnancy Providers Seen During Your Hospitalizations Provider Role Specialty Primary office phone Kike Almaguer MD Attending Provider Obstetrics & Gynecology [phone] Your Primary Care Physician (PCP) Primary Care Physician Office Phone Office Fax [facility] [phone] Follow-up Information Follow up With Details Comments Contact Info Juno Kenyon MD   [address] [facility] 
[phone] Your Appointments   SECTION with Kike Almaguer MD  
[facility] 3 LABOR & DELIVERY ([address]) [address] [facility]  
[phone] Current Discharge Medication List  
  
ASK your doctor about these medications Dose & Instructions Dispensing Information Comments Morning Noon Evening Bedtime COLACE 100 mg capsule Generic drug:  docusate sodium Your last dose was: Your next dose is:    
   
   
 Dose:  100 mg Take 100 mg by mouth two (2) times daily as needed for Constipation. Refills:  0 Iron 325 mg (65 mg iron) tablet Generic drug:  ferrous sulfate Your last dose was: Your next dose is:    
   
   
 Dose:  325 mg Take 325 mg by mouth two (2) times a day. Indications: IRON DEFICIENCY ANEMIA Refills:  0 PERCOCET 5-325 mg per tablet Generic drug:  oxyCODONE-acetaminophen Your last dose was: Your next dose is:    
   
   
 Dose:  1 Tab Take 1 Tab by mouth every six (6) hours as needed for Pain. Refills:  0 PNV38-Iron Cbn&Gluc-FA-DSS-DHA 35-1- mg Cmpk Your last dose was: Your next dose is: Take  by mouth. Refills:  0 Discharge Instructions Weeks 34 to 36 of Your Pregnancy: Care Instructions Your Care Instructions By now, your baby and your belly have grown quite large. It is almost time to give birth. A full-term pregnancy can deliver between 37 and 42 weeks. Your baby's lungs are almost ready to breathe air. The bones in your baby's head are now firm enough to protect it, but soft enough to move down through the birth canal. 
You may feel excited, happy, anxious, or scared. You may wonder how you will know if you are in labor or what to expect during labor. Try to be flexible in your expectations of the birth. Because each birth is different, there is no way to know exactly what childbirth will be like for you. This care sheet will help you know what to expect and how to prepare. This may make your childbirth easier. If you haven't already had the Tdap shot during this pregnancy, talk to your doctor about getting it. It will help protect your  against pertussis infection. In the 36th week, most women have a test for group B streptococcus (GBS). GBS is a common bacteria that can live in the vagina and rectum. It can make your baby sick after birth.  If you test positive, you will get antibiotics during labor. The medicine will keep your baby from getting the bacteria. Follow-up care is a key part of your treatment and safety. Be sure to make and go to all appointments, and call your doctor if you are having problems. It's also a good idea to know your test results and keep a list of the medicines you take. How can you care for yourself at home? Learn about pain relief choices · Pain is different for every woman. Talk with your doctor about your feelings about pain. · You can choose from several types of pain relief. These include medicine or breathing techniques, as well as comfort measures. You can use more than one option. · If you choose to have pain medicine during labor, talk to your doctor about your options. Some medicines lower anxiety and help with some of the pain. Others make your lower body numb so that you won't feel pain. · Be sure to tell your doctor about your pain medicine choice before you start labor or very early in your labor. You may be able to change your mind as labor progresses. · Rarely, a woman is put to sleep by medicine given through a mask or an IV. Labor and delivery · The first stage of labor has three parts: early, active, and transition. ¨ Most women have early labor at home. You can stay busy or rest, eat light snacks, drink clear fluids, and start counting contractions. ¨ When talking during a contraction gets hard, you may be moving to active labor. During active labor, you should head for the hospital if you are not there already. ¨ You are in active labor when contractions come every 3 to 4 minutes and last about 60 seconds. Your cervix is opening more rapidly. ¨ If your water breaks, contractions will come faster and stronger. ¨ During transition, your cervix is stretching, and contractions are coming more rapidly. ¨ You may want to push, but your cervix might not be ready. Your doctor will tell you when to push. · The second stage starts when your cervix is completely opened and you are ready to push. ¨ Contractions are very strong to push the baby down the birth canal. 
¨ You will feel the urge to push. You may feel like you need to have a bowel movement. ¨ You may be coached to push with contractions. These contractions will be very strong, but you will not have them as often. You can get a little rest between contractions. ¨ You may be emotional and irritable. You may not be aware of what is going on around you. ¨ One last push, and your baby is born. · The third stage is when a few more contractions push out the placenta. This may take 30 minutes or less. · The fourth stage is the welcome recovery. You may feel overwhelmed with emotions and exhausted but alert. This is a good time to start breastfeeding. Where can you learn more? Go to http://oscar-omar.info/. Enter F872 in the search box to learn more about \"Weeks 34 to 36 of Your Pregnancy: Care Instructions. \" Current as of: March 16, 2017 Content Version: 11.3 © 1586-6479 [employer]. Care instructions adapted under license by [employer] (which disclaims liability or warranty for this information). If you have questions about a medical condition or this instruction, always ask your healthcare professional. [address] any warranty or liability for your use of this information. Follow up with your provider as scheduled Discharge Orders None Introducing [facility]! [facility] introduces natue patient portal. Now you can access parts of your medical record, email your doctor's office, and request medication refills online. 1. In your internet browser, go to [email or web address] 2. Click on the First Time User? Click Here link in the Sign In box. You will see the New Member Sign Up page. 3. Enter your [employer] Access Code exactly as it appears below. You will not need to use this code after youve completed the sign-up process. If you do not sign up before the expiration date, you must request a new code. · [employer] Access Code: [email or web address] Expires: 7/22/2017  3:59 PM 
 
4. Enter the last four digits of your Social Security Number (xxxx) and Date of Birth (mm/dd/yyyy) as indicated and click Submit. You will be taken to the next sign-up page. 5. Create a [employer] ID. This will be your [employer] login ID and cannot be changed, so think of one that is secure and easy to remember. 6. Create a [employer] password. You can change your password at any time. 7. Enter your Password Reset Question and Answer. This can be used at a later time if you forget your password. 8. Enter your e-mail address. You will receive e-mail notification when new information is available in [address]. 9. Click Sign Up. You can now view and download portions of your medical record. 10. Click the Download Summary menu link to download a portable copy of your medical information. If you have questions, please visit the Frequently Asked Questions section of the [employer] website. Remember, [employer] is NOT to be used for urgent needs. For medical emergencies, dial 911. Now available from your iPhone and Android! General Information Please provide this summary of care documentation to your next provider. Patient Signature:  ____________________________________________________________ Date:  ____________________________________________________________  
  
Amber Valentine Provider Signature:  ____________________________________________________________ Date:  ____________________________________________________________

## 2017-06-29 NOTE — H&P
EDC:2017  EGA: 35 weeks, 3 days      Primary Provider:  Jus Castro MD      History of Present Illness:  35 wks with signficant swelling in LLE today with + arpit's sign. had htn labs sent that are normal (per call from [email or web address]). bought a cuff today and has checked bp at work. 140s/80s excpet one outlyer. Came to hosptial for dopplers which are neg.  other than the burning in her leg she feels fine. Patient's Prenatal Care with Doctor of Record Jus Castro MD Notable For -    Gestational hypertension  GBS positive RX in labor  Threatened PTL  Lower extremity edema, bilateral  Size greater than dates  LGA ____  Subchorionic hematoma or hemorrhage  Threatened   Previous  - type unknown, awaiting op report_____  Ovarian cyst pregnant RIGHT ; corpus luteum  Previous pregnancy w/ preeclampsia   lab screening  Normal pregnancy multigravida  Leiomyoma  Abdominal pain pregnant RLQ          Impression & Recommendations:    Problem # 1:  Gestational hypertension (ICD-642.33) (EDA16-L79.3)  Normal bp here. nst reactive with 1 variable when she was flat on her back. reactive following the variable. precautions reviewed. Orders:  Patient Sent to Hospital (CPT-HOSPGYN)  L&D Outpatient Obs - Medium (CPT-AdmitF)      Problem # 2:  Lower extremity edema, bilateral (ICD-782.3) (CXS46-H01.0)  reassured negative dopplers. discussed comfort measures.           Past Medical History:     Reviewed history from 2016 and no changes required:        Abnormal Pap Smear , normal f/u                    Past Surgical History:     Reviewed history from 2016 and no changes required:                 Appendectomy     Family History Summary:      Reviewed history Last on 2017 and no changes required:2017        Social History:     Reviewed history from 2016 and no changes required:                                Review of Systems See HPI    Except as noted in the HPI, the review of systems is negative for General and . Allergies      Medications Removed from Medication List        Flowsheet View for Follow-up Visit     Estimated weeks of        gestation:  35 3/7     Blood pressure: 120 / 80     Comment:  hosp.  neg dopplers. normal labs. Vital Signs    Blood Pressure: 120 / [de-identified]  FHT Descrip:    reactive       - Additional FHT Comments: 1 variable when on back  Contractions:  yes  UC Frequency:  Irregular    UC Intensity:  mild         Physical Exam     General           General appearance:  no acute distress    Head           Inspection:   normal    Extremeties           Extremeties:  0 edema    Psych           Orientation:  oriented to time, place, and person          Mood:  no appearance of anxiety, depression, or agitation    Skin           Inspection:  no rashes, suspicious lesions, or ulcerations            Impression & Recommendations:    Problem # 1:  Gestational hypertension (ICD-642.33) (WCJ66-L63.3)  Normal bp here. nst reactive with 1 variable when she was flat on her back. reactive following the variable. precautions reviewed. Orders:  Patient Sent to Hospital (CPT-HOSPGYN)  L&D Outpatient Obs - Medium (CPT-AdmitF)      Problem # 2:  Lower extremity edema, bilateral (ICD-782.3) (GXJ37-I28.0)  reassured negative dopplers. discussed comfort measures.       Medications (at conclusion of this visit)    05/18/2017 COLACE 100 MG ORAL CAPS (DOCUSATE SODIUM) 1 by mouth twice daily as needed for constipation  05/18/2017 FERROUS SULFATE 325 (65 FE) MG ORAL TABS (FERROUS SULFATE) 1 by mouth twice daily  12/21/2016 PNV-DHA CAPS (PRENAT W/O U-RO-FZVSKAX-FA-DHA CAPS)           LABORATORY DATA   TEST DATE RESULT   Group B Strep culture 06/15/2017 Positive                                   (Group B Strep Culture Result Field)   Blood Type 12/21/2016 A                                             (Blood Type Result Field) Rh 12/21/2016 Positive                                   (Rh Result Field)   Rhogam Inj Given     Tdap Vaccine Given 05/12/2017 declined   Antibody Screen 05/12/2017 Negative   Rubella  [facility] Reference Ranges On or After 3/10/14                  <0.90              Non-immune      0.90 - 0.99     Equivocal      >0.99              Immune    [facility] Reference Ranges  Before 3/10/14           <5                 Non-immune             5 - 9               Equivocal            >9                 Immune  [facility] Reference Ranges       < Or = 0.90       Negative             0.91-1.09          Equivocal            > Or = 1.10       Positive   12/21/2016     1.00     TPA (T Pallidum Antibodies) 05/12/2017 Negative   Serology (RPR)     HBsAg 12/21/2016 Negative   HIV 12/21/2016 Non Reactive   Hemoglobin 05/12/2017 8.9   Hematocrit 05/12/2017 25.7   Platelets 76/85/8879 202 X10E3/UL   TSH     Urine Culture 12/21/2016 Negative   GC DNA Probe 01/19/2017 Negative   Chlamydia DNA 01/19/2017 Negative   PAP 09/17/2016 NIL   Flu Vaccine Given 12/06/2016 declined   HGBA1C     HGB Electro     T4, Free     BG Fasting     GTT 1H 50G 05/12/2017 139   GTT 1H 100G     GTT 2H 100G     GTT 3H 100G     Glucose Plasma     CF Accept or Decline     CF Screen Result 12/21/2016 Declined   Nuchal Trans 03/16/2017 4.06^4. 06 mm&millimeters   AFP Only     Tetra 02/10/2017 Declined   AFP Serum     CVS 12/21/2016 declined   AFP Amniotic     Amnio Karyo 12/21/2016 declined   FISH     GC Culture     Chlamydia Cult     Ureaplasma     Mycoplasma     WBC 01/31/2017 12.7 X10E3/UL   RBC 01/31/2017 3.52 X10E6/UL   MCV 01/31/2017 93   MCH 01/31/2017 31.8   MCHC RBC 01/31/2017 34.4     ULTRASOUND DATA   TEST DATE RESULT   Estimated Fetal Weight 06/08/2017 2296.81978633^2297 g&grams                                     Weight % 06/08/2017 83^83% %&percent                                                MARCOS 06/08/2017 14.47^14. 5 cm&centimeters                    BPP 06/08/2017 8^8 [n/a]&Not applicable   Cervical Length (mm)             Electronically signed by Guillermo Mcgrath MD on 06/29/2017 at 7:26 PM    ________________________________________________________________________

## 2017-06-29 NOTE — IP AVS SNAPSHOT
Current Discharge Medication List  
  
ASK your doctor about these medications Dose & Instructions Dispensing Information Comments Morning Noon Evening Bedtime COLACE 100 mg capsule Generic drug:  docusate sodium Your last dose was: Your next dose is:    
   
   
 Dose:  100 mg Take 100 mg by mouth two (2) times daily as needed for Constipation. Refills:  0 Iron 325 mg (65 mg iron) tablet Generic drug:  ferrous sulfate Your last dose was: Your next dose is:    
   
   
 Dose:  325 mg Take 325 mg by mouth two (2) times a day. Indications: IRON DEFICIENCY ANEMIA Refills:  0 PERCOCET 5-325 mg per tablet Generic drug:  oxyCODONE-acetaminophen Your last dose was: Your next dose is:    
   
   
 Dose:  1 Tab Take 1 Tab by mouth every six (6) hours as needed for Pain. Refills:  0 PNV38-Iron Cbn&Gluc-FA-DSS-DHA 35-1- mg Cmpk Your last dose was: Your next dose is: Take  by mouth. Refills:  0

## 2017-06-29 NOTE — PROGRESS NOTES
Pt is here for rule out PIH. Pt came from the medical imaging office because she was having dopplers done. Pt has had blurred vision for about a week and half. She has had some elevated blood pressure today, pt has tracked them herself. She had blood work and urine done at the doctor's office this morning and pending results. Pt denies vaginal bleeding and discharge. Positive fetal movement.

## 2017-06-30 NOTE — PROGRESS NOTES
Late Entry:  1910 Dr Robel Leon in to assess the patient and to discuss the POC. Pt had one variable decel otherwise, a reassuring fetal tracing. V/O R/B to watch the pt another hour and the if no more decels, pt may be discharged home.

## 2017-06-30 NOTE — DISCHARGE INSTRUCTIONS

## 2017-06-30 NOTE — PROGRESS NOTES
1858 Variable noted. Pt will stay for further monitor and if NST is reactive and no more decels, pt can be discharged home.

## 2017-06-30 NOTE — PROGRESS NOTES
Discharge instructions reviewed and pt stated an understanding. Reviewed when to call the doctor and keep next appointment as scheduled. Pt signed d/c instructions and copy given.

## 2017-07-10 ENCOUNTER — ANESTHESIA (OUTPATIENT)
Dept: LABOR AND DELIVERY | Age: 37
End: 2017-07-10
Payer: COMMERCIAL

## 2017-07-10 ENCOUNTER — ANESTHESIA EVENT (OUTPATIENT)
Dept: LABOR AND DELIVERY | Age: 37
End: 2017-07-10
Payer: COMMERCIAL

## 2017-07-10 ENCOUNTER — HOSPITAL ENCOUNTER (INPATIENT)
Age: 37
LOS: 3 days | Discharge: HOME | End: 2017-07-13
Attending: OBSTETRICS & GYNECOLOGY | Admitting: OBSTETRICS & GYNECOLOGY
Payer: COMMERCIAL

## 2017-07-10 PROBLEM — O14.90 PREECLAMPSIA: Status: ACTIVE | Noted: 2017-07-10

## 2017-07-10 LAB
ABO + RH BLD: NORMAL
ALBUMIN SERPL BCP-MCNC: 2.6 G/DL (ref 3.5–5)
ALBUMIN/GLOB SERPL: 0.7 {RATIO} (ref 1.1–2.2)
ALP SERPL-CCNC: 135 U/L (ref 45–117)
ALT SERPL-CCNC: 12 U/L (ref 12–78)
ANION GAP BLD CALC-SCNC: 10 MMOL/L (ref 5–15)
AST SERPL W P-5'-P-CCNC: 17 U/L (ref 15–37)
BASOPHILS # BLD AUTO: 0 K/UL (ref 0–0.1)
BASOPHILS # BLD: 0 % (ref 0–1)
BILIRUB SERPL-MCNC: 1 MG/DL (ref 0.2–1)
BLOOD GROUP ANTIBODIES SERPL: NORMAL
BUN SERPL-MCNC: 8 MG/DL (ref 6–20)
BUN/CREAT SERPL: 20 (ref 12–20)
CALCIUM SERPL-MCNC: 8.1 MG/DL (ref 8.5–10.1)
CHLORIDE SERPL-SCNC: 106 MMOL/L (ref 97–108)
CO2 SERPL-SCNC: 21 MMOL/L (ref 21–32)
CREAT SERPL-MCNC: 0.4 MG/DL (ref 0.55–1.02)
EOSINOPHIL # BLD: 0 K/UL (ref 0–0.4)
EOSINOPHIL NFR BLD: 0 % (ref 0–7)
ERYTHROCYTE [DISTWIDTH] IN BLOOD BY AUTOMATED COUNT: 15.2 % (ref 11.5–14.5)
GLOBULIN SER CALC-MCNC: 3.7 G/DL (ref 2–4)
GLUCOSE SERPL-MCNC: 72 MG/DL (ref 65–100)
HCT VFR BLD AUTO: 26.9 % (ref 35–47)
HGB BLD-MCNC: 8.8 G/DL (ref 11.5–16)
LDH SERPL L TO P-CCNC: 202 U/L (ref 81–246)
LYMPHOCYTES # BLD AUTO: 15 % (ref 12–49)
LYMPHOCYTES # BLD: 1.1 K/UL (ref 0.8–3.5)
MCH RBC QN AUTO: 27.2 PG (ref 26–34)
MCHC RBC AUTO-ENTMCNC: 32.7 G/DL (ref 30–36.5)
MCV RBC AUTO: 83 FL (ref 80–99)
MONOCYTES # BLD: 0.6 K/UL (ref 0–1)
MONOCYTES NFR BLD AUTO: 9 % (ref 5–13)
NEUTS SEG # BLD: 5.5 K/UL (ref 1.8–8)
NEUTS SEG NFR BLD AUTO: 76 % (ref 32–75)
PLATELET # BLD AUTO: 141 K/UL (ref 150–400)
POTASSIUM SERPL-SCNC: 3.8 MMOL/L (ref 3.5–5.1)
PROT SERPL-MCNC: 6.3 G/DL (ref 6.4–8.2)
RBC # BLD AUTO: 3.24 M/UL (ref 3.8–5.2)
SODIUM SERPL-SCNC: 137 MMOL/L (ref 136–145)
SPECIMEN EXP DATE BLD: NORMAL
URATE SERPL-MCNC: 2.4 MG/DL (ref 2.6–6)
WBC # BLD AUTO: 7.1 K/UL (ref 3.6–11)

## 2017-07-10 PROCEDURE — 74011250636 HC RX REV CODE- 250/636

## 2017-07-10 PROCEDURE — 76060000078 HC EPIDURAL ANESTHESIA: Performed by: OBSTETRICS & GYNECOLOGY

## 2017-07-10 PROCEDURE — 74011250636 HC RX REV CODE- 250/636: Performed by: OBSTETRICS & GYNECOLOGY

## 2017-07-10 PROCEDURE — 84550 ASSAY OF BLOOD/URIC ACID: CPT | Performed by: OBSTETRICS & GYNECOLOGY

## 2017-07-10 PROCEDURE — 77030018836 HC SOL IRR NACL ICUM -A

## 2017-07-10 PROCEDURE — 75410000002 HC LABOR FEE PER 1 HR

## 2017-07-10 PROCEDURE — 83615 LACTATE (LD) (LDH) ENZYME: CPT | Performed by: OBSTETRICS & GYNECOLOGY

## 2017-07-10 PROCEDURE — 77030002933 HC SUT MCRYL J&J -A

## 2017-07-10 PROCEDURE — 80053 COMPREHEN METABOLIC PANEL: CPT | Performed by: OBSTETRICS & GYNECOLOGY

## 2017-07-10 PROCEDURE — 85025 COMPLETE CBC W/AUTO DIFF WBC: CPT | Performed by: OBSTETRICS & GYNECOLOGY

## 2017-07-10 PROCEDURE — 74011000250 HC RX REV CODE- 250

## 2017-07-10 PROCEDURE — 77030011640 HC PAD GRND REM COVD -A

## 2017-07-10 PROCEDURE — 76010000391 HC C SECN FIRST 1 HR: Performed by: OBSTETRICS & GYNECOLOGY

## 2017-07-10 PROCEDURE — 76010000392 HC C SECN EA ADDL 0.5 HR: Performed by: OBSTETRICS & GYNECOLOGY

## 2017-07-10 PROCEDURE — 76060000033 HC ANESTHESIA 1 TO 1.5 HR: Performed by: OBSTETRICS & GYNECOLOGY

## 2017-07-10 PROCEDURE — 77030007866 HC KT SPN ANES BBMI -B: Performed by: ANESTHESIOLOGY

## 2017-07-10 PROCEDURE — 36415 COLL VENOUS BLD VENIPUNCTURE: CPT | Performed by: OBSTETRICS & GYNECOLOGY

## 2017-07-10 PROCEDURE — 77010026065 HC OXYGEN MINIMUM MEDICAL AIR

## 2017-07-10 PROCEDURE — 77030031139 HC SUT VCRL2 J&J -A

## 2017-07-10 PROCEDURE — 75410000003 HC RECOV DEL/VAG/CSECN EA 0.5 HR

## 2017-07-10 PROCEDURE — 77030010507 HC ADH SKN DERMBND J&J -B

## 2017-07-10 PROCEDURE — 86900 BLOOD TYPING SEROLOGIC ABO: CPT | Performed by: OBSTETRICS & GYNECOLOGY

## 2017-07-10 PROCEDURE — 74011250637 HC RX REV CODE- 250/637: Performed by: OBSTETRICS & GYNECOLOGY

## 2017-07-10 PROCEDURE — 65410000002 HC RM PRIVATE OB

## 2017-07-10 RX ORDER — ACETAMINOPHEN 10 MG/ML
INJECTION, SOLUTION INTRAVENOUS AS NEEDED
Status: DISCONTINUED | OUTPATIENT
Start: 2017-07-10 | End: 2017-07-10 | Stop reason: HOSPADM

## 2017-07-10 RX ORDER — SODIUM CHLORIDE 0.9 % (FLUSH) 0.9 %
5-10 SYRINGE (ML) INJECTION AS NEEDED
Status: DISCONTINUED | OUTPATIENT
Start: 2017-07-10 | End: 2017-07-10 | Stop reason: HOSPADM

## 2017-07-10 RX ORDER — CEFAZOLIN SODIUM IN 0.9 % NACL 2 G/100 ML
2 PLASTIC BAG, INJECTION (ML) INTRAVENOUS ONCE
Status: COMPLETED | OUTPATIENT
Start: 2017-07-10 | End: 2017-07-10

## 2017-07-10 RX ORDER — KETOROLAC TROMETHAMINE 30 MG/ML
15 INJECTION, SOLUTION INTRAMUSCULAR; INTRAVENOUS
Status: CANCELLED | OUTPATIENT
Start: 2017-07-10 | End: 2017-07-11

## 2017-07-10 RX ORDER — KETOROLAC TROMETHAMINE 30 MG/ML
30 INJECTION, SOLUTION INTRAMUSCULAR; INTRAVENOUS
Status: DISCONTINUED | OUTPATIENT
Start: 2017-07-10 | End: 2017-07-13 | Stop reason: HOSPADM

## 2017-07-10 RX ORDER — BUPIVACAINE HYDROCHLORIDE 7.5 MG/ML
INJECTION, SOLUTION EPIDURAL; RETROBULBAR AS NEEDED
Status: DISCONTINUED | OUTPATIENT
Start: 2017-07-10 | End: 2017-07-10 | Stop reason: HOSPADM

## 2017-07-10 RX ORDER — OXYTOCIN 10 [USP'U]/ML
INJECTION, SOLUTION INTRAMUSCULAR; INTRAVENOUS AS NEEDED
Status: DISCONTINUED | OUTPATIENT
Start: 2017-07-10 | End: 2017-07-10 | Stop reason: HOSPADM

## 2017-07-10 RX ORDER — ONDANSETRON 2 MG/ML
INJECTION INTRAMUSCULAR; INTRAVENOUS AS NEEDED
Status: DISCONTINUED | OUTPATIENT
Start: 2017-07-10 | End: 2017-07-10 | Stop reason: HOSPADM

## 2017-07-10 RX ORDER — IBUPROFEN 600 MG/1
600 TABLET ORAL
Status: CANCELLED | OUTPATIENT
Start: 2017-07-10

## 2017-07-10 RX ORDER — SODIUM CHLORIDE 0.9 % (FLUSH) 0.9 %
5-10 SYRINGE (ML) INJECTION AS NEEDED
Status: DISCONTINUED | OUTPATIENT
Start: 2017-07-10 | End: 2017-07-13 | Stop reason: HOSPADM

## 2017-07-10 RX ORDER — SODIUM CHLORIDE 0.9 % (FLUSH) 0.9 %
5-10 SYRINGE (ML) INJECTION EVERY 8 HOURS
Status: DISCONTINUED | OUTPATIENT
Start: 2017-07-10 | End: 2017-07-13 | Stop reason: HOSPADM

## 2017-07-10 RX ORDER — ACETAMINOPHEN 325 MG/1
650 TABLET ORAL
Status: DISCONTINUED | OUTPATIENT
Start: 2017-07-10 | End: 2017-07-13 | Stop reason: HOSPADM

## 2017-07-10 RX ORDER — MORPHINE SULFATE 0.5 MG/ML
INJECTION, SOLUTION EPIDURAL; INTRATHECAL; INTRAVENOUS AS NEEDED
Status: DISCONTINUED | OUTPATIENT
Start: 2017-07-10 | End: 2017-07-10 | Stop reason: HOSPADM

## 2017-07-10 RX ORDER — IBUPROFEN 400 MG/1
800 TABLET ORAL EVERY 8 HOURS
Status: DISCONTINUED | OUTPATIENT
Start: 2017-07-10 | End: 2017-07-13 | Stop reason: HOSPADM

## 2017-07-10 RX ORDER — DIPHENHYDRAMINE HCL 25 MG
25 CAPSULE ORAL
Status: DISCONTINUED | OUTPATIENT
Start: 2017-07-10 | End: 2017-07-13 | Stop reason: HOSPADM

## 2017-07-10 RX ORDER — OXYCODONE AND ACETAMINOPHEN 5; 325 MG/1; MG/1
1 TABLET ORAL
Status: DISCONTINUED | OUTPATIENT
Start: 2017-07-10 | End: 2017-07-13 | Stop reason: HOSPADM

## 2017-07-10 RX ORDER — DOCUSATE SODIUM 100 MG/1
100 CAPSULE, LIQUID FILLED ORAL 2 TIMES DAILY
Status: DISCONTINUED | OUTPATIENT
Start: 2017-07-10 | End: 2017-07-13 | Stop reason: HOSPADM

## 2017-07-10 RX ORDER — DIPHENHYDRAMINE HYDROCHLORIDE 50 MG/ML
25-50 INJECTION, SOLUTION INTRAMUSCULAR; INTRAVENOUS
Status: CANCELLED | OUTPATIENT
Start: 2017-07-10

## 2017-07-10 RX ORDER — SODIUM CHLORIDE, SODIUM LACTATE, POTASSIUM CHLORIDE, CALCIUM CHLORIDE 600; 310; 30; 20 MG/100ML; MG/100ML; MG/100ML; MG/100ML
100 INJECTION, SOLUTION INTRAVENOUS CONTINUOUS
Status: DISCONTINUED | OUTPATIENT
Start: 2017-07-10 | End: 2017-07-13 | Stop reason: HOSPADM

## 2017-07-10 RX ORDER — NALOXONE HYDROCHLORIDE 0.4 MG/ML
0.4 INJECTION, SOLUTION INTRAMUSCULAR; INTRAVENOUS; SUBCUTANEOUS AS NEEDED
Status: DISCONTINUED | OUTPATIENT
Start: 2017-07-10 | End: 2017-07-13 | Stop reason: HOSPADM

## 2017-07-10 RX ORDER — OXYCODONE AND ACETAMINOPHEN 5; 325 MG/1; MG/1
1-2 TABLET ORAL
Status: CANCELLED | OUTPATIENT
Start: 2017-07-10

## 2017-07-10 RX ORDER — SODIUM CHLORIDE, SODIUM LACTATE, POTASSIUM CHLORIDE, CALCIUM CHLORIDE 600; 310; 30; 20 MG/100ML; MG/100ML; MG/100ML; MG/100ML
1000 INJECTION, SOLUTION INTRAVENOUS CONTINUOUS
Status: DISCONTINUED | OUTPATIENT
Start: 2017-07-10 | End: 2017-07-10 | Stop reason: HOSPADM

## 2017-07-10 RX ORDER — OXYTOCIN/RINGER'S LACTATE 20/1000 ML
125-500 PLASTIC BAG, INJECTION (ML) INTRAVENOUS ONCE
Status: ACTIVE | OUTPATIENT
Start: 2017-07-10 | End: 2017-07-11

## 2017-07-10 RX ORDER — SIMETHICONE 80 MG
80 TABLET,CHEWABLE ORAL AS NEEDED
Status: DISCONTINUED | OUTPATIENT
Start: 2017-07-10 | End: 2017-07-13 | Stop reason: HOSPADM

## 2017-07-10 RX ORDER — PHENYLEPHRINE HCL IN 0.9% NACL 0.4MG/10ML
SYRINGE (ML) INTRAVENOUS AS NEEDED
Status: DISCONTINUED | OUTPATIENT
Start: 2017-07-10 | End: 2017-07-10 | Stop reason: HOSPADM

## 2017-07-10 RX ORDER — ONDANSETRON 2 MG/ML
4 INJECTION INTRAMUSCULAR; INTRAVENOUS
Status: CANCELLED | OUTPATIENT
Start: 2017-07-10

## 2017-07-10 RX ORDER — MORPHINE SULFATE 0.5 MG/ML
INJECTION, SOLUTION EPIDURAL; INTRATHECAL; INTRAVENOUS
Status: DISPENSED
Start: 2017-07-10 | End: 2017-07-11

## 2017-07-10 RX ORDER — SODIUM CHLORIDE 0.9 % (FLUSH) 0.9 %
5-10 SYRINGE (ML) INJECTION EVERY 8 HOURS
Status: DISCONTINUED | OUTPATIENT
Start: 2017-07-10 | End: 2017-07-10 | Stop reason: HOSPADM

## 2017-07-10 RX ORDER — SODIUM CHLORIDE, SODIUM LACTATE, POTASSIUM CHLORIDE, CALCIUM CHLORIDE 600; 310; 30; 20 MG/100ML; MG/100ML; MG/100ML; MG/100ML
INJECTION, SOLUTION INTRAVENOUS
Status: DISCONTINUED | OUTPATIENT
Start: 2017-07-10 | End: 2017-07-10 | Stop reason: HOSPADM

## 2017-07-10 RX ORDER — NALOXONE HYDROCHLORIDE 0.4 MG/ML
0.4 INJECTION, SOLUTION INTRAMUSCULAR; INTRAVENOUS; SUBCUTANEOUS AS NEEDED
Status: CANCELLED | OUTPATIENT
Start: 2017-07-10

## 2017-07-10 RX ADMIN — CEFAZOLIN 2 G: 10 INJECTION, POWDER, FOR SOLUTION INTRAVENOUS; PARENTERAL at 15:23

## 2017-07-10 RX ADMIN — ONDANSETRON 4 MG: 2 INJECTION INTRAMUSCULAR; INTRAVENOUS at 16:25

## 2017-07-10 RX ADMIN — Medication 40 MCG: at 16:26

## 2017-07-10 RX ADMIN — KETOROLAC TROMETHAMINE 30 MG: 30 INJECTION, SOLUTION INTRAMUSCULAR at 20:41

## 2017-07-10 RX ADMIN — SODIUM CHLORIDE, SODIUM LACTATE, POTASSIUM CHLORIDE, AND CALCIUM CHLORIDE 100 ML/HR: 600; 310; 30; 20 INJECTION, SOLUTION INTRAVENOUS at 18:39

## 2017-07-10 RX ADMIN — OXYTOCIN 20 UNITS: 10 INJECTION, SOLUTION INTRAMUSCULAR; INTRAVENOUS at 16:19

## 2017-07-10 RX ADMIN — MORPHINE SULFATE 500 MCG: 0.5 INJECTION, SOLUTION EPIDURAL; INTRATHECAL; INTRAVENOUS at 15:55

## 2017-07-10 RX ADMIN — SODIUM CHLORIDE, SODIUM LACTATE, POTASSIUM CHLORIDE, CALCIUM CHLORIDE: 600; 310; 30; 20 INJECTION, SOLUTION INTRAVENOUS at 16:19

## 2017-07-10 RX ADMIN — SODIUM CHLORIDE, SODIUM LACTATE, POTASSIUM CHLORIDE, AND CALCIUM CHLORIDE 1000 ML: 600; 310; 30; 20 INJECTION, SOLUTION INTRAVENOUS at 11:43

## 2017-07-10 RX ADMIN — SODIUM CHLORIDE, SODIUM LACTATE, POTASSIUM CHLORIDE, CALCIUM CHLORIDE: 600; 310; 30; 20 INJECTION, SOLUTION INTRAVENOUS at 15:47

## 2017-07-10 RX ADMIN — SODIUM CHLORIDE, SODIUM LACTATE, POTASSIUM CHLORIDE, AND CALCIUM CHLORIDE 1000 ML: 600; 310; 30; 20 INJECTION, SOLUTION INTRAVENOUS at 12:45

## 2017-07-10 RX ADMIN — Medication 40 MCG: at 16:34

## 2017-07-10 RX ADMIN — ACETAMINOPHEN 1000 MG: 10 INJECTION, SOLUTION INTRAVENOUS at 16:32

## 2017-07-10 RX ADMIN — Medication 40 MCG: at 16:24

## 2017-07-10 RX ADMIN — Medication 40 MCG: at 16:36

## 2017-07-10 RX ADMIN — Medication 10 ML: at 15:11

## 2017-07-10 RX ADMIN — BUPIVACAINE HYDROCHLORIDE 12.5 MG: 7.5 INJECTION, SOLUTION EPIDURAL; RETROBULBAR at 15:55

## 2017-07-10 RX ADMIN — DIPHENHYDRAMINE HYDROCHLORIDE 25 MG: 25 CAPSULE ORAL at 18:31

## 2017-07-10 NOTE — PROGRESS NOTES
1505 Verbal report received from Paul Samson. JUAN LUIS using SBAR, MAR and I&O    1510 Pt on efm x2, fhts 140, IV flushed, site benign, IV fluids restarted at bolus. 1543 To OR 12    1715 Pt to 3316 by bed s/p repeat c/s    1915 Bedside report given to BERENICE Jha RN using SBAR, MAR and I&O.

## 2017-07-10 NOTE — ANESTHESIA POSTPROCEDURE EVALUATION
Post-Anesthesia Evaluation and Assessment    Patient: Elissa Hdez MRN: [id number]  SSN: [id number]    YOB: 1980  Age: 31 y.o. Sex: female       Cardiovascular Function/Vital Signs  Visit Vitals    /74    Pulse (!) 59    Temp 36.4 °C (97.5 °F)    Resp 20    Ht 5' 5\" (1.651 m)    Wt 82.1 kg (181 lb)    SpO2 96%    Breastfeeding Unknown    BMI 30.12 kg/m2       Patient is status post spinal anesthesia for Procedure(s):   SECTION. Nausea/Vomiting: None    Postoperative hydration reviewed and adequate. Pain:  Pain Scale 1: Numeric (0 - 10) (07/10/17 1716)  Pain Intensity 1: 0 (07/10/17 1716)   Managed    Neurological Status:   Neuro (WDL): Within Defined Limits (07/10/17 1716)   At baseline    Mental Status and Level of Consciousness: Arousable    Pulmonary Status:   O2 Device: Room air (07/10/17 1716)   Adequate oxygenation and airway patent    Complications related to anesthesia: None    Post-anesthesia assessment completed.  No concerns    Signed By: Federico Michele MD     July 10, 2017

## 2017-07-10 NOTE — IP AVS SNAPSHOT
Summary of Care Report The Summary of Care report has been created to help improve care coordination. Users with access to [employer] or [address] (Web-based application) may access additional patient information including the Discharge Summary. If you are not currently a [address] user and need more information, please call the number listed below in the [place] [address] section and ask to be connected with Medical Records. Facility Information Name Address Phone [address] [address] [phone] Patient Information Patient Name Sex CHADWICK Watts ([id number]) Female 1980 Discharge Information Admitting Provider Service Area Unit Anant Baig MD / [address] [facility] 3 Mother Infant / [phone] Discharge Provider Discharge Date/Time Discharge Disposition Destination (none) 2017 (Pending) AHR (none) Patient Language Language ENGLISH [13] Hospital Problems as of 2017  Reviewed: 7/10/2017  3:42 PM by Rashad Kilgore MD  
 None Non-Hospital Problems as of 2017  Reviewed: 7/10/2017  3:42 PM by Rashad Kilgore MD  
 None You are allergic to the following No active allergies Current Discharge Medication List  
  
START taking these medications Dose & Instructions Dispensing Information Comments  
 docusate sodium 100 mg capsule Commonly known as:  Carlos Parody Dose:  100 mg Take 1 Cap by mouth two (2) times a day for 90 days. Quantity:  60 Cap Refills:  2  
   
 ibuprofen 600 mg tablet Commonly known as:  MOTRIN Dose:  600 mg Take 1 Tab by mouth every six (6) hours as needed for Pain for up to 360 days. Quantity:  30 Tab Refills:  0  
   
 iron, carbonyl 45 mg Tab Commonly known as:  [place] Dose:  1 Tab Take 1 Tab by mouth daily. Quantity:  30 Tab Refills:  2  
   
 oxyCODONE-acetaminophen 5-325 mg per tablet Commonly known as:  PERCOCET Dose:  1-2 Tab Take 1-2 Tabs by mouth every four (4) hours as needed for Pain. Max Daily Amount: 12 Tabs. Quantity:  30 Tab Refills:  0 CONTINUE these medications which have NOT CHANGED Dose & Instructions Dispensing Information Comments PNV38-Iron Cbn&Gluc-FA-DSS-DHA 35-1- mg Cmpk Take  by mouth. Refills:  0 Surgery Information ID Date/Time Status Primary Surgeon All Procedures Location [id number] 7/10/2017 [address] MD Christiano  SECTION [facility] L&D OR Follow-up Information Follow up With Details Comments Contact Info Liz Michel MD   [address] [facility]. [phone] Discharge Instructions POST DELIVERY DISCHARGE INSTRUCTIONS Name: Anamaria Reveles YOB: 1980 Primary Diagnosis: Active Problems: 
  Preeclampsia (7/10/2017) General:  
 
Diet/Diet Restrictions: 
· Eight 8-ounce glasses of fluid daily (water, juices); avoid excessive caffeine intake. · Meals/snacks as desired which are high in fiber and carbohydrates and low in fat and cholesterol. Medications:  
 
 
 
Physical Activity / Restrictions / Safety: · Avoid heavy lifting, no more that 8 lbs. For 2-3 weeks;  
· Limit use of stairs to 2 times daily for the first week home. · No driving for one week. · Avoid intercourse 4-6 weeks, no douching or tampon use. · Check with obstetrician before starting or resuming an exercise program.   
 
Discharge Instructions/Special Treatment/Home Care Needs:  
 
· Continue prenatal vitamins. · Continue to use squirt bottle with warm water on your episiotomy after each bathroom use until bleeding stops. · If steri-strips applied to your incision, remove in 7-10 days. Call your doctor for the following: · Fever over 101 degrees by mouth. · Vaginal bleeding heavier than a normal menstrual period or clots larger than a golf ball. · Red streaks or increased swelling of legs, painful red streaks on your breast. 
· Painful urination, constipation and increased pain or swelling or discharge with your incision. · If you feel extremely anxious or overwhelmed. · If you have thoughts of harming yourself and/or your baby. Pain Management:  
 
· Take Acetaminophen (Tylenol) or Ibuprofen (Advil, Motrin), as directed for pain. · Use a warm Sitz bath 3 times daily to relieve episiotomy or hemorrhoidal discomfort. · For hemorrhoidal discomfort, use Tucks and Anusol cream as needed and directed. · Heating pad to  incision as needed. Follow-Up Care:  
 
Appointment with MD: Follow-up Appointments Procedures  FOLLOW UP VISIT Appointment in: One Week For blood pressure check and 6 weeks for postpartum check with Dr Sylvester Ospina For blood pressure check and 6 weeks for postpartum check with Dr Sylvester Ospina Standing Status:   Standing Number of Occurrences:   1 Order Specific Question:   Appointment in Answer: One Week Telephone number: [phone] Signed By: Sami Hamilton MD                                                                                                   Date: 2017 Time: 8:47 AM 
 
[employer] Activation Thank you for requesting access to [employer]. Please follow the instructions below to securely access and download your online medical record. [employer] allows you to send messages to your doctor, view your test results, renew your prescriptions, schedule appointments, and more. How Do I Sign Up? 1. In your internet browser, go to [email or web address]. 2. Click on the First Time User? Click Here link in the Sign In box. You will see the New Member Sign Up page. 3. Enter your [employer] Access Code exactly as it appears below. You will not need to use this code after youve completed the sign-up process. If you do not sign up before the expiration date, you must request a new code. Lever Access Code: [email or web address] Expires: 2017  3:59 PM (This is the date your [email or web address] access code will ) 4. Enter the last four digits of your Social Security Number (xxxx) and Date of Birth (mm/dd/yyyy) as indicated and click Submit. You will be taken to the next sign-up page. 5. Create a [email or web address] ID. This will be your Lever login ID and cannot be changed, so think of one that is secure and easy to remember. 6. Create a Lever password. You can change your password at any time. 7. Enter your Password Reset Question and Answer. This can be used at a later time if you forget your password. 8. Enter your e-mail address. You will receive e-mail notification when new information is available in [address] 9. Click Sign Up. You can now view and download portions of your medical record. 10. Click the Download Summary menu link to download a portable copy of your medical information. Additional Information If you have questions, please visit the Frequently Asked Questions section of the Lever website at [email or web address] Remember, Lever is NOT to be used for urgent needs. For medical emergencies, dial 911. Chart Review Routing History Recipient Method Report Sent By Parul Patiño MD  
Fax: [phone] Phone: [phone] Fax Casper Hernandez MD NOTES AUTO ROUTING REPORT Campos Tapia MD [[id number]] 6/15/2017  5:38 PM 06/15/2017 Korey Patiño MD  
Fax: [phone] Phone: [phone] Fax Casper Hernandez MD NOTES AUTO ROUTING REPORT MD Magda Kennedy 2017  2:59 PM 2017 Korey Patiño MD  
Fax: [phone] Phone: [phone] Fax Hodan Carodna MD NOTES AUTO ROUTING REPORT Juda Fothergill, MD [facility] 6/29/2017  7:26 PM 06/29/2017 Denyce Kawasaki, MD  
Fax: [phone] Phone: [phone] Fax Hodan Cardona MD NOTES AUTO ROUTING REPORT Alexander Ontiveros MD [[id number]] 7/10/2017 12:57 PM 07/10/2017 Denyce Kawasaki, MD  
Fax: [phone] Phone: [phone] Fax Hodan Cardona MD NOTES AUTO ROUTING REPORT Myron Colon MD [[id number]] 7/13/2017  8:48 AM 07/13/2017

## 2017-07-10 NOTE — IP AVS SNAPSHOT
Current Discharge Medication List  
  
START taking these medications Dose & Instructions Dispensing Information Comments Morning Noon Evening Bedtime  
 docusate sodium 100 mg capsule Commonly known as:  Merary Collins Your last dose was: Your next dose is:    
   
   
 Dose:  100 mg Take 1 Cap by mouth two (2) times a day for 90 days. Quantity:  60 Cap Refills:  2  
     
   
   
   
  
 ibuprofen 600 mg tablet Commonly known as:  MOTRIN Your last dose was: Your next dose is:    
   
   
 Dose:  600 mg Take 1 Tab by mouth every six (6) hours as needed for Pain for up to 360 days. Quantity:  30 Tab Refills:  0  
     
   
   
   
  
 iron, carbonyl 45 mg Tab Commonly known as:  Alda Alias Your last dose was: Your next dose is:    
   
   
 Dose:  1 Tab Take 1 Tab by mouth daily. Quantity:  30 Tab Refills:  2  
     
   
   
   
  
 oxyCODONE-acetaminophen 5-325 mg per tablet Commonly known as:  PERCOCET Your last dose was: Your next dose is:    
   
   
 Dose:  1-2 Tab Take 1-2 Tabs by mouth every four (4) hours as needed for Pain. Max Daily Amount: 12 Tabs. Quantity:  30 Tab Refills:  0 CONTINUE these medications which have NOT CHANGED Dose & Instructions Dispensing Information Comments Morning Noon Evening Bedtime PNV38-Iron Cbn&Gluc-FA-DSS-DHA 35-1- mg Cmpk Your last dose was: Your next dose is: Take  by mouth. Refills:  0 Where to Get Your Medications Information on where to get these meds will be given to you by the nurse or doctor. ! Ask your nurse or doctor about these medications  
  docusate sodium 100 mg capsule  
 ibuprofen 600 mg tablet  
 iron, carbonyl 45 mg Tab  
 oxyCODONE-acetaminophen 5-325 mg per tablet

## 2017-07-10 NOTE — ANESTHESIA PROCEDURE NOTES
Spinal Block    Start time: 7/10/2017 3:50 PM  End time: 7/10/2017 3:55 PM  Performed by: Иван Espinoza  Authorized by: Nithya Mcfarlane     Pre-procedure:   Indications: at surgeon's request, post-op pain management and primary anesthetic  Preanesthetic Checklist: patient identified, risks and benefits discussed, anesthesia consent, site marked, patient being monitored and timeout performed    Timeout Time: 15:50          Spinal Block:   Patient Position:  Seated  Prep Region:  Lumbar  Prep: DuraPrep      Location:  L2-3  Technique:  Single shot  Local:  Lidocaine 1%  Local Dose (mL):  5    Needle:   Needle Type:  Pencan  Needle Gauge:  25 G  Attempts:  1      Events: CSF confirmed, no blood with aspiration and no paresthesia        Assessment:  Insertion:  Uncomplicated  Patient tolerance:  Patient tolerated the procedure well with no immediate complications  1.6 ml 1.74% bupivacaine with 0.5mg duramorph additive

## 2017-07-10 NOTE — OP NOTES
Operative Note    Name: Conner Doss Record Number: [id number]   YOB: 1980  Today's Date: July 10, 2017      Pre-operative Diagnosis: IUP at 37/0wks, Prior CS x1 - desires repeat, Pre-eclampsia without severe features    Post-operative Diagnosis: IUP at 37/0wks, Prior CS x1 - desires repeat, Pre-eclampsia without severe features    Procedure: low transverse  section Procedure(s):   SECTION    Surgeon(s):  MD Aleja Keller MD    Anesthesia: Spinal    Prophylactic Antibiotics: Ancef    DVT Prophylaxis: Sequential Compression Devices         Fetal Description: vigorous menendez male 7lb 10oz    Birth Information:   Information for the patient's :  Shon Pop [[id number]]   Delivery of a 3.465 kg Male [2] infant on 7/10/2017 at 4:19 PM. Apgars were 8 and 9. Umbilical Cord:     Umbilical Cord Events:     Placenta:  removal with  appearance. Amniotic Fluid Volume:  None     Amniotic Fluid Description:  Clear        Placenta:  expressed Expressed    Estimated Blood Loss (ml):  600mL    Specimens: Cord gas pH 8.84           Complications:  none    Procedure Detail:      After proper patient identification and consent, the patient was taken to the operating room, where spinal anesthesia was administered. The patient was placed in the dorsal supine position with a leftward tilt. A womack catheter was placed using sterile technique. SCDs were applied and activated. The patient was prepped and draped in the normal sterile fashion. Anesthesia was tested and found to be adequate. A time-out was called and all parties were in agreement. The abdomen was entered using the Pfannenstiel technique. The peritoneum was entered bluntly well superior to the bladder without any apparent injury. There were some adhesions of the omentum to the anterior abdominal wall. These were lysed with the bovie. A bladder flap was created.  A low transverse uterine incision was made with the scalpel and extended with blunt finger dissection by pulling in a cephalo-caudid direction. Amniotomy was performed and the fluid was medium amount clear. The babys head was then delivered atraumatically. A loose nuchal cord was easily reduced and the rest of the body was delivered. The cord was clamped and cut and the baby was handed off to Nursing staff in attendance. The placenta was then removed from the uterus. The uterus was curettaged with a moist lap pad and cleared of all clots and debris. The uterine incision was closed with 0 monocryl in a running locking fashion. A second layer of the same suture was imbricated over the first. An additional figure-of-eight of 0 monocryl was required to achieve hemostasis. Excellent hemostasis was assured. The gutters were irrigated and cleared of all clot and debris. The undersides of the fascia were examined and made hemostatic with cautery. The fascia was re-approximated with running 0-vicryl. At this time, there was some blood that made its way through the fascia from the abdomen. The fascia was re-opened and the pelvis explored for active bleeding. There was no active bleeding and irrigation was noted to run clear. The fascia was once again re-approximated with running 0-vicryl. The subcutaneous tissue was irrigated and suctioned. It was made hemostatic with cautery. The skin was closed with a 3-0 monocryl subcuticular closure and reinforced with dermabond. This concluded the procedure. All sponge, instrument, and needle counts were correct times three. Mother and baby were taken to recovery/postpartum room in stable condition.     Anant Baig MD  July 10, 2017  5:07 PM

## 2017-07-10 NOTE — H&P
EDC:2017  EGA: 36 weeks, 4 days      Vital Signs   31Years Old Female  Weight: 181. 5 pounds  BP:       148/90    Pap/HPV/Gardasil History   History of abnormal pap: yes  Gardasil Injection History: Not Applicable    Patient's Prenatal Care with Doctor of Record Anant Baig MD Notable For -    *Note: RLTCS scheduled for 7/10/17 at 12:30pm  [facility])  Mild preeclampsia, unspecified trimester  Gestational hypertension  GBS positive RX in labor  Threatened PTL  Lower extremity edema, bilateral  Size greater than dates  LGA ____  Subchorionic hematoma or hemorrhage  Threatened   Previous  - type unknown, awaiting op report_____  Ovarian cyst pregnant RIGHT ; corpus luteum  Previous pregnancy w/ preeclampsia   lab screening  Normal pregnancy multigravida  Leiomyoma  Abdominal pain pregnant RLQ                  Allergies      Medications Removed from Medication List        Flowsheet View for Follow-up Visit     Estimated weeks of        gestation:  36 4/7     Weight:  181.5     Blood pressure: 148 / 90     Urine Protein:  N     Urine Glucose: N     Headache:  No     Nausea/vomiting: occ. nausea     Edema:  2+gen     Vaginal bleeding: no     Vaginal discharge: no     Fetal activity:  yes     Fundal height:    37     FHR:   160s     Labor symptoms: few ctx     Fetal position:  vertex     Cx Dilation:  0     Cx Effacement: 0%     Cx Station:  -3     Preceptor:  [email or web address]     Comment:  Dx of mild PEC - 24hr urine protein 318mg. Denies HA, vision changes or RUQ pain. Rec delivery at 37wks. Scheduled for RLTCS 7/10/17. NST reactive today. Fetal Surveillance      NST Fetus A  An NST was performed and was reactive. The baseline FHR was 155 and regular. Moderate variablity was present. There were no decelerations noted. Impression & Recommendations:    Problem # 1:  Mild preeclampsia, unspecified trimester (GZH-457.34) (PKJ37-N08.00)  Pre-eclampsia without severe features.    Repeat CS scheduled for 37/0wks. R/B of repeat CS and delivery at early term EGA reviewed. Orders:  Fetal non-stress test (QPR-12400B)  Antepartum Care (CPT-10)      Problem # 2:  GBS positive RX in labor (HDN-204.87) (BAL47-K04.82)      Medications (at conclusion of this visit)    2017 COLACE 100 MG ORAL CAPS (DOCUSATE SODIUM) 1 by mouth twice daily as needed for constipation  2017 FERROUS SULFATE 325 (65 FE) MG ORAL TABS (FERROUS SULFATE) 1 by mouth twice daily  2016 PNV-DHA CAPS (PRENAT W/O T-FB-DWVKITM-FA-DHA CAPS)           Primary Provider:  Reyes Lorenz MD    CC:  mild preeclampsia. History of Present Illness:  30 y/o [id number]@ 36/4wks presents for PN visit. Recent dx of PEC without severe features - mildly elevated BP, 24hr urine protein 318mg. Scheduled for repeat CS at 37/0wks due to this dx. Denies HA, vision changes or RUQ pain. Pregnancy complicated by h/o PEC - induced in 36th week - Low Transverse  Section for fetal distress. Past Medical History:     Reviewed history from 2016 and no changes required:        Abnormal Pap Smear , normal f/u                    Past Surgical History:     Reviewed history from 2016 and no changes required:                 Appendectomy     Family History Summary:      Reviewed history Last on 2017 and no changes required:2017        Social History:     Reviewed history from 2016 and no changes required:                                Review of Systems        See HPI    Except as noted in the HPI, the review of systems is negative for General, Breast, , CV, Resp, GI, Endo, MS, Derm, Neuro, Psych, Eyes, ENT, Allergy and Heme. Vital Signs    Blood Pressure: 148 / 90  NST:   reactive     Weight:  181. 5 pounds      Physical Exam     General           General appearance:  no acute distress    Head           Inspection:   normal    Eyes           External:   EOM intact    ENT           Dental:   adequate dentition    Chest           Lungs:  clear to auscultation          Heart:  regular rate and rhythm    Extremeties           Extremeties:  0 edema    Neurological           Reflexes:  1+ and symmetric with no pathological reflexes    Psych           Orientation:  oriented to time, place, and person          Mood:  no appearance of anxiety, depression, or agitation    Lymph           Inguinal:  no inguinal adenopathy    Skin           Inspection:  no rashes, suspicious lesions, or ulcerations    Abdomen           Abdomen:  gravid          Fundal Height:  37          EFW:  7lbs    Pelvic Exam           EGBUS:  no lesions          Vagina:  normal appearing without lesions or discharge          Uterus:  gravid          Cervix:  no lesions or discharge                  Dilation: : 0                  Effacement:  0%                  Station:  -3                  Presentation:  vertex                  Membranes:  intact    Allergies      Medications Removed from Medication List        Impression & Recommendations:    Problem # 1:  Mild preeclampsia, unspecified trimester (CJU-906.52) (ZAT78-Y60.00)  Pre-eclampsia without severe features. Repeat CS scheduled for 37/0wks. R/B of repeat CS and delivery at early term EGA reviewed.      Orders:  Fetal non-stress test ([id number])  Antepartum Care (CPT-10)      Problem # 2:  GBS positive RX in labor (UYD-010.46) (RPA03-H42.82)      Medications (at conclusion of this visit)    05/18/2017 COLACE 100 MG ORAL CAPS (DOCUSATE SODIUM) 1 by mouth twice daily as needed for constipation  05/18/2017 FERROUS SULFATE 325 (65 FE) MG ORAL TABS (FERROUS SULFATE) 1 by mouth twice daily  12/21/2016 PNV-DHA CAPS (PRENAT W/O Q-ML-CWOJMTH-FA-DHA CAPS)           LABORATORY DATA   TEST DATE RESULT   Group B Strep culture 06/15/2017 Positive                                   (Group B Strep Culture Result Field)   Blood Type 12/21/2016 A (Blood Type Result Field)   Rh 12/21/2016 Positive                                   (Rh Result Field)   Rhogam Inj Given     Tdap Vaccine Given 05/12/2017 declined   Antibody Screen 05/12/2017 Negative   Rubella  [facility] Reference Ranges On or After 3/10/14                  <0.90              Non-immune      0.90 - 0.99     Equivocal      >0.99              Immune    [facility] Reference Ranges  Before 3/10/14           <5                 Non-immune             5 - 9               Equivocal            >9                 Immune  [facility] Reference Ranges       < Or = 0.90       Negative             0.91-1.09          Equivocal            > Or = 1.10       Positive   12/21/2016     1.00     TPA (T Pallidum Antibodies) 05/12/2017 Negative   Serology (RPR)     HBsAg 12/21/2016 Negative   HIV 12/21/2016 Non Reactive   Hemoglobin 06/29/2017 8.7   Hematocrit 06/29/2017 26.9   Platelets 17/86/5851 154 X10E3/UL   TSH     Urine Culture 12/21/2016 Negative   GC DNA Probe 01/19/2017 Negative   Chlamydia DNA 01/19/2017 Negative   PAP 09/17/2016 NIL   Flu Vaccine Given 12/06/2016 declined   HGBA1C     HGB Electro     T4, Free     BG Fasting     GTT 1H 50G 05/12/2017 139   GTT 1H 100G     GTT 2H 100G     GTT 3H 100G     Glucose Plasma     CF Accept or Decline     CF Screen Result 12/21/2016 Declined   Nuchal Trans 03/16/2017 4.06^4. 06 mm&millimeters   AFP Only     Tetra 02/10/2017 Declined   AFP Serum     CVS 12/21/2016 declined   AFP Amniotic     Amnio Karyo 12/21/2016 declined   FISH     GC Culture     Chlamydia Cult     Ureaplasma     Mycoplasma     WBC 06/29/2017 7.5 X10E3/UL   RBC 06/29/2017 3.22 X10E6/UL   MCV 06/29/2017 84   MCH 06/29/2017 27.0   MCHC RBC 06/29/2017 32.3     ULTRASOUND DATA   TEST DATE RESULT   Estimated Fetal Weight 06/08/2017 2296.35232388^2297 g&grams                                     Weight % 06/08/2017 83^83% %&percent                                                MARCOS 06/08/2017 08.20^53. 5 cm&centimeters                    BPP 06/08/2017 8^8 [n/a]&Not applicable   Cervical Length (mm)       ]      Electronically signed by Shirley Michaels MD on 07/07/2017 at 10:48 AM    ________________________________________________________________________    Date of Surgery Update:  Nayla Fairchild was seen and examined. History and physical has been reviewed. The patient has been examined. There have been no significant clinical changes since the completion of the originally dated History and Physical.    Patient Vitals for the past 12 hrs:   Temp Pulse Resp BP SpO2   07/10/17 1237 - 86 - 132/84 (!) 25 %   07/10/17 1202 98.3 °F (36.8 °C) 79 18 142/85 -     Recent Results (from the past 12 hour(s))   CBC WITH AUTOMATED DIFF    Collection Time: 07/10/17 11:38 AM   Result Value Ref Range    WBC 7.1 3.6 - 11.0 K/uL    RBC 3.24 (L) 3.80 - 5.20 M/uL    HGB 8.8 (L) 11.5 - 16.0 g/dL    HCT 26.9 (L) 35.0 - 47.0 %    MCV 83.0 80.0 - 99.0 FL    MCH 27.2 26.0 - 34.0 PG    MCHC 32.7 30.0 - 36.5 g/dL    RDW 15.2 (H) 11.5 - 14.5 %    PLATELET 053 (L) 390 - 400 K/uL    NEUTROPHILS 76 (H) 32 - 75 %    LYMPHOCYTES 15 12 - 49 %    MONOCYTES 9 5 - 13 %    EOSINOPHILS 0 0 - 7 %    BASOPHILS 0 0 - 1 %    ABS. NEUTROPHILS 5.5 1.8 - 8.0 K/UL    ABS. LYMPHOCYTES 1.1 0.8 - 3.5 K/UL    ABS. MONOCYTES 0.6 0.0 - 1.0 K/UL    ABS. EOSINOPHILS 0.0 0.0 - 0.4 K/UL    ABS.  BASOPHILS 0.0 0.0 - 0.1 K/UL   METABOLIC PANEL, COMPREHENSIVE    Collection Time: 07/10/17 11:38 AM   Result Value Ref Range    Sodium 137 136 - 145 mmol/L    Potassium 3.8 3.5 - 5.1 mmol/L    Chloride 106 97 - 108 mmol/L    CO2 21 21 - 32 mmol/L    Anion gap 10 5 - 15 mmol/L    Glucose 72 65 - 100 mg/dL    BUN 8 6 - 20 MG/DL    Creatinine 0.40 (L) 0.55 - 1.02 MG/DL    BUN/Creatinine ratio 20 12 - 20      GFR est AA >60 >60 ml/min/1.73m2    GFR est non-AA >60 >60 ml/min/1.73m2    Calcium 8.1 (L) 8.5 - 10.1 MG/DL    Bilirubin, total 1.0 0.2 - 1.0 MG/DL    ALT (SGPT) 12 12 - 78 U/L    AST (SGOT) 17 15 - 37 U/L    Alk.  phosphatase 135 (H) 45 - 117 U/L    Protein, total 6.3 (L) 6.4 - 8.2 g/dL    Albumin 2.6 (L) 3.5 - 5.0 g/dL    Globulin 3.7 2.0 - 4.0 g/dL    A-G Ratio 0.7 (L) 1.1 - 2.2           Signed By: Florentina Runner, MD     July 10, 2017 12:56 PM

## 2017-07-10 NOTE — IP AVS SNAPSHOT
[address] [facility] 
[phone] Patient: Davy Vaz MRN: [id number] :1980 You are allergic to the following No active allergies Recent Documentation Height Weight Breastfeeding? BMI OB Status Smoking Status 1.651 m 82.1 kg Unknown 30.12 kg/m2 Recent pregnancy Former Smoker Unresulted Labs Order Current Status SAMPLE TO BLOOD BANK In process Emergency Contacts Name Discharge Info Relation Home Work Mobile Adelfo Haney DISCHARGE CAREGIVER [3] Life Partner [7] [phone] About your hospitalization You were admitted on:  July 10, 2017 You last received care in the:  [facility] You were discharged on:  2017 Unit phone number:  [phone] Why you were hospitalized Your primary diagnosis was:  Not on File Your diagnoses also included:  Preeclampsia Providers Seen During Your Hospitalizations Provider Role Specialty Primary office phone Marleen Vance MD Attending Provider Obstetrics & Gynecology [phone] Your Primary Care Physician (PCP) Primary Care Physician Office Phone Office Fax [facility] [phone] Follow-up Information Follow up With Details Comments Contact Info Tamra Koch MD   [address] [address] 
[phone] Current Discharge Medication List  
  
START taking these medications Dose & Instructions Dispensing Information Comments Morning Noon Evening Bedtime  
 docusate sodium 100 mg capsule Commonly known as:  [place] Breech Your last dose was: Your next dose is:    
   
   
 Dose:  100 mg Take 1 Cap by mouth two (2) times a day for 90 days. Quantity:  60 Cap Refills:  2  
     
   
   
   
  
 ibuprofen 600 mg tablet Commonly known as:  MOTRIN  
   
 Your last dose was: Your next dose is:    
   
   
 Dose:  600 mg Take 1 Tab by mouth every six (6) hours as needed for Pain for up to 360 days. Quantity:  30 Tab Refills:  0  
     
   
   
   
  
 iron, carbonyl 45 mg Tab Commonly known as:  Vandana Mcfadden Your last dose was: Your next dose is:    
   
   
 Dose:  1 Tab Take 1 Tab by mouth daily. Quantity:  30 Tab Refills:  2  
     
   
   
   
  
 oxyCODONE-acetaminophen 5-325 mg per tablet Commonly known as:  PERCOCET Your last dose was: Your next dose is:    
   
   
 Dose:  1-2 Tab Take 1-2 Tabs by mouth every four (4) hours as needed for Pain. Max Daily Amount: 12 Tabs. Quantity:  30 Tab Refills:  0 CONTINUE these medications which have NOT CHANGED Dose & Instructions Dispensing Information Comments Morning Noon Evening Bedtime PNV38-Iron Cbn&Gluc-FA-DSS-DHA 35-1- mg Cmpk Your last dose was: Your next dose is: Take  by mouth. Refills:  0 Where to Get Your Medications Information on where to get these meds will be given to you by the nurse or doctor. ! Ask your nurse or doctor about these medications  
  docusate sodium 100 mg capsule  
 ibuprofen 600 mg tablet  
 iron, carbonyl 45 mg Tab  
 oxyCODONE-acetaminophen 5-325 mg per tablet Discharge Instructions POST DELIVERY DISCHARGE INSTRUCTIONS Name: Caridad Bidr YOB: 1980 Primary Diagnosis: Active Problems: 
  Preeclampsia (7/10/2017) General:  
 
Diet/Diet Restrictions: 
· Eight 8-ounce glasses of fluid daily (water, juices); avoid excessive caffeine intake. · Meals/snacks as desired which are high in fiber and carbohydrates and low in fat and cholesterol. Medications:  
 
 
 
Physical Activity / Restrictions / Safety: · Avoid heavy lifting, no more that 8 lbs.  For 2-3 weeks;  
 · Limit use of stairs to 2 times daily for the first week home. · No driving for one week. · Avoid intercourse 4-6 weeks, no douching or tampon use. · Check with obstetrician before starting or resuming an exercise program.   
 
Discharge Instructions/Special Treatment/Home Care Needs:  
 
· Continue prenatal vitamins. · Continue to use squirt bottle with warm water on your episiotomy after each bathroom use until bleeding stops. · If steri-strips applied to your incision, remove in 7-10 days. Call your doctor for the following: · Fever over 101 degrees by mouth. · Vaginal bleeding heavier than a normal menstrual period or clots larger than a golf ball. · Red streaks or increased swelling of legs, painful red streaks on your breast. 
· Painful urination, constipation and increased pain or swelling or discharge with your incision. · If you feel extremely anxious or overwhelmed. · If you have thoughts of harming yourself and/or your baby. Pain Management:  
 
· Take Acetaminophen (Tylenol) or Ibuprofen (Advil, Motrin), as directed for pain. · Use a warm Sitz bath 3 times daily to relieve episiotomy or hemorrhoidal discomfort. · For hemorrhoidal discomfort, use Tucks and Anusol cream as needed and directed. · Heating pad to  incision as needed. Follow-Up Care:  
 
Appointment with MD: Follow-up Appointments Procedures  FOLLOW UP VISIT Appointment in: One Week For blood pressure check and 6 weeks for postpartum check with Dr Rahul Vega For blood pressure check and 6 weeks for postpartum check with Dr Rahul Vega Standing Status:   Standing Number of Occurrences:   1 Order Specific Question:   Appointment in Answer: One Week Telephone number: [phone] Signed By: Venkat Jimenez MD                                                                                                   Date: 2017 Time: 8:47 AM 
 
MyChart Activation Thank you for requesting access to [facility]. Please follow the instructions below to securely access and download your online medical record. [facility] allows you to send messages to your doctor, view your test results, renew your prescriptions, schedule appointments, and more. How Do I Sign Up? 1. In your internet browser, go to [email or web address]. 2. Click on the First Time User? Click Here link in the Sign In box. You will see the New Member Sign Up page. 3. Enter your [facility] Access Code exactly as it appears below. You will not need to use this code after youve completed the sign-up process. If you do not sign up before the expiration date, you must request a new code. [facility] Access Code: [email or web address] Expires: 2017  3:59 PM (This is the date your [facility] access code will ) 4. Enter the last four digits of your Social Security Number (xxxx) and Date of Birth (mm/dd/yyyy) as indicated and click Submit. You will be taken to the next sign-up page. 5. Create a Redknee ID. This will be your [facility] login ID and cannot be changed, so think of one that is secure and easy to remember. 6. Create a [facility] password. You can change your password at any time. 7. Enter your Password Reset Question and Answer. This can be used at a later time if you forget your password. 8. Enter your e-mail address. You will receive e-mail notification when new information is available in [address] 9. Click Sign Up. You can now view and download portions of your medical record. 10. Click the Download Summary menu link to download a portable copy of your medical information. Additional Information If you have questions, please visit the Frequently Asked Questions section of the [facility] website at [email or web address] Remember, [facility] is NOT to be used for urgent needs. For medical emergencies, dial 911. Discharge Orders None sevenload Announcement We are excited to announce that we are making your provider's discharge notes available to you in sevenload. You will see these notes when they are completed and signed by the physician that discharged you from your recent hospital stay. If you have any questions or concerns about any information you see in sevenload, please call the Health Information Department where you were seen or reach out to your Primary Care Provider for more information about your plan of care. Introducing [facility]! Piotr Goldman introduces sevenload patient portal. Now you can access parts of your medical record, email your doctor's office, and request medication refills online. 1. In your internet browser, go to [email or web address] 2. Click on the First Time User? Click Here link in the Sign In box. You will see the New Member Sign Up page. 3. Enter your sevenload Access Code exactly as it appears below. You will not need to use this code after youve completed the sign-up process. If you do not sign up before the expiration date, you must request a new code. · sevenload Access Code: [email or web address] Expires: 7/22/2017  3:59 PM 
 
4. Enter the last four digits of your Social Security Number (xxxx) and Date of Birth (mm/dd/yyyy) as indicated and click Submit. You will be taken to the next sign-up page. 5. Create a sevenload ID. This will be your sevenload login ID and cannot be changed, so think of one that is secure and easy to remember. 6. Create a sevenload password. You can change your password at any time. 7. Enter your Password Reset Question and Answer. This can be used at a later time if you forget your password. 8. Enter your e-mail address. You will receive e-mail notification when new information is available in [address] 9. Click Sign Up. You can now view and download portions of your medical record. 10. Click the Download Summary menu link to download a portable copy of your medical information. If you have questions, please visit the Frequently Asked Questions section of the [employer] website. Remember, MyChart is NOT to be used for urgent needs. For medical emergencies, dial 911. Now available from your iPhone and Android! General Information Please provide this summary of care documentation to your next provider. Patient Signature:  ____________________________________________________________ Date:  ____________________________________________________________  
  
[facility] Provider Signature:  ____________________________________________________________ Date:  ____________________________________________________________

## 2017-07-10 NOTE — L&D DELIVERY NOTE
Delivery Summary  Patient: Eather Fraction             Circumcision:   desires  Additional Delivery Comments - Repeat LTCS @ 37/0wks for pre-eclampsia without severe features. Vigorous male infant delivered. Baby boy Lakeisha Ogden - 7lb 10oz      Information for the patient's :  Saul Goldman [[id number]]       Labor Events:    Labor: No   Rupture Date: 7/10/2017   Rupture Time: 4:18 PM   Rupture Type AROM   Amniotic Fluid Volume: None    Amniotic Fluid Description: Clear None   Induction:         Augmentation:     Labor Events:       Cervical Ripening:     None     Delivery Events:  Episiotomy:     Laceration(s):       Repaired:      Number of Repair Packets:     Suture Type and Size:       Estimated Blood Loss (ml):  ml       Delivery Date: 7/10/2017    Delivery Time: 4:19 PM  Delivery Type: , Low Transverse  Sex:  Male     Gestational Age: 37w0d   Delivery Clinician:  Klever Ford  Living Status: Living   Delivery Location: OR            APGARS  One minute Five minutes Ten minutes   Skin color: 0   1        Heart rate: 2   2        Grimace: 2   2        Muscle tone: 2   2        Breathin   2        Totals: 8   9            Presentation: Vertex    Position:        Resuscitation Method:  Suctioning-bulb; Tactile Stimulation     Meconium Stained: None      Cord Vessels: 3 Vessels      Cord Events:    Cord Blood Sent?:  No    Blood Gases Sent?:  Yes    Placenta:  Date/Time: 7/10  4:20 PM  Removal: Expressed      Appearance: Intact      Measurements:  Birth Weight: 3.465 kg      Birth Length: 52 cm      Head Circumference: 36 cm      Chest Circumference: 34 cm     Abdominal Girth: 30.5 cm    Other Providers:   Gregorio TANG;DARRYL NEUMANN;;;;;;;;Kiersten RENE, Obstetrician;Primary Nurse;Primary  Nurse;Nicu Nurse;Neonatologist;Anesthesiologist;Crna;Nurse Practitioner;Midwife;Nursery Nurse           Cord pH:  pH 7.25    Episiotomy: Laceration(s):       Estimated Blood Loss (ml):     Labor Events  Method:        Augmentation:     Cervical Ripening:       None        Operative Vaginal Delivery - none    Group B Strep:   Lab Results   Component Value Date/Time    GrBStrep, External Positive 06/15/2017     Information for the patient's :  Isaak Peers [[id number]]   No results found for: ABORH, PCTABR, PCTDIG, BILI, ABORHEXT, ABORH    No results found for: APH, APCO2, APO2, AHCO3, ABEC, ABDC, O2ST, EPHV, PCO2V, PO2V, HCO3V, EBEV, EBDV, SITE, RSCOM

## 2017-07-10 NOTE — LACTATION NOTE
This note was copied from a baby's chart. Infant fed at 1820 for 15 minutes. This is Mom's first experience breastfeeding. Mom has  and She nursed in football position on left side. Mom wanted to know \"how do I know how is getting enough ? \". Explained breastfeeding log with feeds and expected output. Encourage responding to feeding cues and waking to feed every 2 to 3 hours if sleepy. Infant has wide gape and strong suck. Encouraged deep latchning for good milk production and prevention of tenderness. Given lanolin to help prevent tenderness. Mom's plan was to breast and formula feed but if it goes well she will stick to exclusive breastfeeding. Encourage asking for assistance as needed.

## 2017-07-10 NOTE — ANESTHESIA PREPROCEDURE EVALUATION
Anesthetic History   No history of anesthetic complications            Review of Systems / Medical History  Patient summary reviewed, nursing notes reviewed and pertinent labs reviewed    Pulmonary          Smoker         Neuro/Psych   Within defined limits           Cardiovascular    Hypertension              Exercise tolerance: >4 METS     GI/Hepatic/Renal  Within defined limits              Endo/Other        Anemia     Other Findings   Comments: IUP         Physical Exam    Airway  Mallampati: II  TM Distance: 4 - 6 cm  Neck ROM: normal range of motion   Mouth opening: Normal     Cardiovascular  Regular rate and rhythm,  S1 and S2 normal,  no murmur, click, rub, or gallop             Dental  No notable dental hx       Pulmonary  Breath sounds clear to auscultation               Abdominal  GI exam deferred       Other Findings            Anesthetic Plan    ASA: 2  Anesthesia type: spinal            Anesthetic plan and risks discussed with: Patient

## 2017-07-11 LAB
BASOPHILS # BLD AUTO: 0 K/UL (ref 0–0.1)
BASOPHILS # BLD: 0 % (ref 0–1)
EOSINOPHIL # BLD: 0 K/UL (ref 0–0.4)
EOSINOPHIL NFR BLD: 0 % (ref 0–7)
ERYTHROCYTE [DISTWIDTH] IN BLOOD BY AUTOMATED COUNT: 15.4 % (ref 11.5–14.5)
HCT VFR BLD AUTO: 25.2 % (ref 35–47)
HGB BLD-MCNC: 8.2 G/DL (ref 11.5–16)
LYMPHOCYTES # BLD AUTO: 13 % (ref 12–49)
LYMPHOCYTES # BLD: 1 K/UL (ref 0.8–3.5)
MCH RBC QN AUTO: 27.8 PG (ref 26–34)
MCHC RBC AUTO-ENTMCNC: 32.5 G/DL (ref 30–36.5)
MCV RBC AUTO: 85.4 FL (ref 80–99)
MONOCYTES # BLD: 0.7 K/UL (ref 0–1)
MONOCYTES NFR BLD AUTO: 10 % (ref 5–13)
NEUTS SEG # BLD: 6.1 K/UL (ref 1.8–8)
NEUTS SEG NFR BLD AUTO: 77 % (ref 32–75)
PLATELET # BLD AUTO: 125 K/UL (ref 150–400)
RBC # BLD AUTO: 2.95 M/UL (ref 3.8–5.2)
WBC # BLD AUTO: 7.8 K/UL (ref 3.6–11)

## 2017-07-11 PROCEDURE — 36415 COLL VENOUS BLD VENIPUNCTURE: CPT | Performed by: OBSTETRICS & GYNECOLOGY

## 2017-07-11 PROCEDURE — 74011250636 HC RX REV CODE- 250/636: Performed by: OBSTETRICS & GYNECOLOGY

## 2017-07-11 PROCEDURE — 85025 COMPLETE CBC W/AUTO DIFF WBC: CPT | Performed by: OBSTETRICS & GYNECOLOGY

## 2017-07-11 PROCEDURE — 65410000002 HC RM PRIVATE OB

## 2017-07-11 PROCEDURE — 74011250637 HC RX REV CODE- 250/637: Performed by: OBSTETRICS & GYNECOLOGY

## 2017-07-11 RX ADMIN — IBUPROFEN 800 MG: 400 TABLET, FILM COATED ORAL at 18:49

## 2017-07-11 RX ADMIN — DOCUSATE SODIUM 100 MG: 100 CAPSULE, LIQUID FILLED ORAL at 18:49

## 2017-07-11 RX ADMIN — OXYCODONE HYDROCHLORIDE AND ACETAMINOPHEN 1 TABLET: 5; 325 TABLET ORAL at 21:07

## 2017-07-11 RX ADMIN — SODIUM CHLORIDE, SODIUM LACTATE, POTASSIUM CHLORIDE, AND CALCIUM CHLORIDE 100 ML/HR: 600; 310; 30; 20 INJECTION, SOLUTION INTRAVENOUS at 03:41

## 2017-07-11 RX ADMIN — OXYCODONE HYDROCHLORIDE AND ACETAMINOPHEN 1 TABLET: 5; 325 TABLET ORAL at 06:07

## 2017-07-11 RX ADMIN — OXYCODONE HYDROCHLORIDE AND ACETAMINOPHEN 1 TABLET: 5; 325 TABLET ORAL at 10:51

## 2017-07-11 RX ADMIN — DOCUSATE SODIUM 100 MG: 100 CAPSULE, LIQUID FILLED ORAL at 09:05

## 2017-07-11 RX ADMIN — IBUPROFEN 800 MG: 400 TABLET, FILM COATED ORAL at 02:05

## 2017-07-11 RX ADMIN — IBUPROFEN 800 MG: 400 TABLET, FILM COATED ORAL at 09:05

## 2017-07-11 RX ADMIN — OXYCODONE HYDROCHLORIDE AND ACETAMINOPHEN 1 TABLET: 5; 325 TABLET ORAL at 15:35

## 2017-07-11 NOTE — PROGRESS NOTES
Post-Operative  Day 1    Kailyn CHETAN Cruzon     Assessment: Post-Op day 1, stable    Plan:   1. Routine post-operative care   2. The risks and benefits of the circumcision  procedure and anesthesia including: bleeding, infection, variability of cosmetic results were discussed at length with the mother. She is aware that future repeat procedures may be necessary. She gives informed consent to proceed as noted and her questions are answered. 3.  Anemia stable, iron on discharge, pt has at home already. Information for the patient's :  Jane Chawla [[id number]]   , Low Transverse   Patient doing well without significant complaint. Nausea and vomiting resolved, tolerating liquids, no flatus, womack in place. Vitals:  Visit Vitals    /66 (BP 1 Location: Left arm, BP Patient Position: At rest)    Pulse 67    Temp 98.4 °F (36.9 °C)    Resp 16    Ht 5' 5\" (1.651 m)    Wt 82.1 kg (181 lb)    SpO2 96%    Breastfeeding Unknown    BMI 30.12 kg/m2     Temp (24hrs), Av °F (36.7 °C), Min:97.5 °F (36.4 °C), Max:98.6 °F (37 °C)      Last 24hr Input/Output:    Intake/Output Summary (Last 24 hours) at 17 0911  Last data filed at 17 3417   Gross per 24 hour   Intake             3110 ml   Output             1275 ml   Net             1835 ml          Exam:        Patient without distress. Lungs clear. Abdomen, bowel sounds present, soft, expected tenderness, fundus firm Wound dressing intact     Perineum normal lochia noted               Lower extremities are negative for swelling, cords or tenderness.     Labs:   Lab Results   Component Value Date/Time    WBC 7.8 2017 03:50 AM    WBC 7.1 07/10/2017 11:38 AM    WBC 7.1 2013 02:43 PM    HGB 8.2 2017 03:50 AM    HGB 8.8 07/10/2017 11:38 AM    HGB 11.4 2013 02:43 PM    HCT 25.2 2017 03:50 AM    HCT 26.9 07/10/2017 11:38 AM    HCT 34.1 2013 02:43 PM    PLATELET 149  03:50 AM PLATELET 552 72/16/3046 11:38 AM    PLATELET 922 28/55/6089 02:43 PM       Recent Results (from the past 24 hour(s))   CBC WITH AUTOMATED DIFF    Collection Time: 07/10/17 11:38 AM   Result Value Ref Range    WBC 7.1 3.6 - 11.0 K/uL    RBC 3.24 (L) 3.80 - 5.20 M/uL    HGB 8.8 (L) 11.5 - 16.0 g/dL    HCT 26.9 (L) 35.0 - 47.0 %    MCV 83.0 80.0 - 99.0 FL    MCH 27.2 26.0 - 34.0 PG    MCHC 32.7 30.0 - 36.5 g/dL    RDW 15.2 (H) 11.5 - 14.5 %    PLATELET 665 (L) 971 - 400 K/uL    NEUTROPHILS 76 (H) 32 - 75 %    LYMPHOCYTES 15 12 - 49 %    MONOCYTES 9 5 - 13 %    EOSINOPHILS 0 0 - 7 %    BASOPHILS 0 0 - 1 %    ABS. NEUTROPHILS 5.5 1.8 - 8.0 K/UL    ABS. LYMPHOCYTES 1.1 0.8 - 3.5 K/UL    ABS. MONOCYTES 0.6 0.0 - 1.0 K/UL    ABS. EOSINOPHILS 0.0 0.0 - 0.4 K/UL    ABS. BASOPHILS 0.0 0.0 - 0.1 K/UL   METABOLIC PANEL, COMPREHENSIVE    Collection Time: 07/10/17 11:38 AM   Result Value Ref Range    Sodium 137 136 - 145 mmol/L    Potassium 3.8 3.5 - 5.1 mmol/L    Chloride 106 97 - 108 mmol/L    CO2 21 21 - 32 mmol/L    Anion gap 10 5 - 15 mmol/L    Glucose 72 65 - 100 mg/dL    BUN 8 6 - 20 MG/DL    Creatinine 0.40 (L) 0.55 - 1.02 MG/DL    BUN/Creatinine ratio 20 12 - 20      GFR est AA >60 >60 ml/min/1.73m2    GFR est non-AA >60 >60 ml/min/1.73m2    Calcium 8.1 (L) 8.5 - 10.1 MG/DL    Bilirubin, total 1.0 0.2 - 1.0 MG/DL    ALT (SGPT) 12 12 - 78 U/L    AST (SGOT) 17 15 - 37 U/L    Alk.  phosphatase 135 (H) 45 - 117 U/L    Protein, total 6.3 (L) 6.4 - 8.2 g/dL    Albumin 2.6 (L) 3.5 - 5.0 g/dL    Globulin 3.7 2.0 - 4.0 g/dL    A-G Ratio 0.7 (L) 1.1 - 2.2     TYPE & SCREEN    Collection Time: 07/10/17 11:38 AM   Result Value Ref Range    Crossmatch Expiration 07/13/2017     ABO/Rh(D) A POSITIVE     Antibody screen NEG    URIC ACID    Collection Time: 07/10/17 11:38 AM   Result Value Ref Range    Uric acid 2.4 (L) 2.6 - 6.0 MG/DL   LD    Collection Time: 07/10/17 11:38 AM   Result Value Ref Range     81 - 246 U/L   CBC WITH AUTOMATED DIFF    Collection Time: 07/11/17  3:50 AM   Result Value Ref Range    WBC 7.8 3.6 - 11.0 K/uL    RBC 2.95 (L) 3.80 - 5.20 M/uL    HGB 8.2 (L) 11.5 - 16.0 g/dL    HCT 25.2 (L) 35.0 - 47.0 %    MCV 85.4 80.0 - 99.0 FL    MCH 27.8 26.0 - 34.0 PG    MCHC 32.5 30.0 - 36.5 g/dL    RDW 15.4 (H) 11.5 - 14.5 %    PLATELET 470 (L) 700 - 400 K/uL    NEUTROPHILS 77 (H) 32 - 75 %    LYMPHOCYTES 13 12 - 49 %    MONOCYTES 10 5 - 13 %    EOSINOPHILS 0 0 - 7 %    BASOPHILS 0 0 - 1 %    ABS. NEUTROPHILS 6.1 1.8 - 8.0 K/UL    ABS. LYMPHOCYTES 1.0 0.8 - 3.5 K/UL    ABS. MONOCYTES 0.7 0.0 - 1.0 K/UL    ABS. EOSINOPHILS 0.0 0.0 - 0.4 K/UL    ABS.  BASOPHILS 0.0 0.0 - 0.1 K/UL

## 2017-07-11 NOTE — PROGRESS NOTES
Bedside shift change report given to JONNATHAN Díaz RN (oncoming nurse) by LIZBETH Stock RN (offgoing nurse). Report included the following information SBAR.

## 2017-07-11 NOTE — PROGRESS NOTES
Duramorph Follow-Up Note    1 Day Post-Op sp Procedure(s):   SECTION. /66 (BP 1 Location: Left arm, BP Patient Position: At rest)  Pulse 67  Temp 36.9 °C (98.4 °F)  Resp 16  Ht 5' 5\" (1.651 m)  Wt 82.1 kg (181 lb)  SpO2 96%  Breastfeeding? Unknown  BMI 30.12 kg/m2. Patient is POD-1 S/P intrathecal duramorph. Pain is well controlled  Patient reports no headache, fever, weakness or numbness. Epidural/spinal tap site is clean, dry and intact. No obvious Anesthesia complications noted. Plan:    Pain management as per primary service.

## 2017-07-12 LAB
ALBUMIN SERPL BCP-MCNC: 2.4 G/DL (ref 3.5–5)
ALBUMIN/GLOB SERPL: 0.6 {RATIO} (ref 1.1–2.2)
ALP SERPL-CCNC: 121 U/L (ref 45–117)
ALT SERPL-CCNC: 19 U/L (ref 12–78)
ANION GAP BLD CALC-SCNC: 7 MMOL/L (ref 5–15)
AST SERPL W P-5'-P-CCNC: 44 U/L (ref 15–37)
BASOPHILS # BLD AUTO: 0 K/UL (ref 0–0.1)
BASOPHILS # BLD: 0 % (ref 0–1)
BILIRUB SERPL-MCNC: 1.1 MG/DL (ref 0.2–1)
BUN SERPL-MCNC: 6 MG/DL (ref 6–20)
BUN/CREAT SERPL: 13 (ref 12–20)
CALCIUM SERPL-MCNC: 8.2 MG/DL (ref 8.5–10.1)
CHLORIDE SERPL-SCNC: 105 MMOL/L (ref 97–108)
CO2 SERPL-SCNC: 25 MMOL/L (ref 21–32)
CREAT SERPL-MCNC: 0.46 MG/DL (ref 0.55–1.02)
DIFFERENTIAL METHOD BLD: ABNORMAL
EOSINOPHIL # BLD: 0 K/UL (ref 0–0.4)
EOSINOPHIL NFR BLD: 0 % (ref 0–7)
ERYTHROCYTE [DISTWIDTH] IN BLOOD BY AUTOMATED COUNT: 15.5 % (ref 11.5–14.5)
GLOBULIN SER CALC-MCNC: 3.8 G/DL (ref 2–4)
GLUCOSE SERPL-MCNC: 86 MG/DL (ref 65–100)
HCT VFR BLD AUTO: 28.6 % (ref 35–47)
HGB BLD-MCNC: 9.1 G/DL (ref 11.5–16)
LYMPHOCYTES # BLD AUTO: 7 % (ref 12–49)
LYMPHOCYTES # BLD: 0.6 K/UL (ref 0.8–3.5)
MCH RBC QN AUTO: 26.6 PG (ref 26–34)
MCHC RBC AUTO-ENTMCNC: 31.8 G/DL (ref 30–36.5)
MCV RBC AUTO: 83.6 FL (ref 80–99)
MONOCYTES # BLD: 0.7 K/UL (ref 0–1)
MONOCYTES NFR BLD AUTO: 8 % (ref 5–13)
NEUTS SEG # BLD: 7.1 K/UL (ref 1.8–8)
NEUTS SEG NFR BLD AUTO: 85 % (ref 32–75)
PLATELET # BLD AUTO: 143 K/UL (ref 150–400)
POTASSIUM SERPL-SCNC: 3.9 MMOL/L (ref 3.5–5.1)
PROT SERPL-MCNC: 6.2 G/DL (ref 6.4–8.2)
RBC # BLD AUTO: 3.42 M/UL (ref 3.8–5.2)
RBC MORPH BLD: ABNORMAL
SODIUM SERPL-SCNC: 137 MMOL/L (ref 136–145)
WBC # BLD AUTO: 8.4 K/UL (ref 3.6–11)

## 2017-07-12 PROCEDURE — 65410000002 HC RM PRIVATE OB

## 2017-07-12 PROCEDURE — 36415 COLL VENOUS BLD VENIPUNCTURE: CPT | Performed by: OBSTETRICS & GYNECOLOGY

## 2017-07-12 PROCEDURE — 80053 COMPREHEN METABOLIC PANEL: CPT | Performed by: OBSTETRICS & GYNECOLOGY

## 2017-07-12 PROCEDURE — 77030018846 HC SOL IRR STRL H20 ICUM -A

## 2017-07-12 PROCEDURE — 74011250637 HC RX REV CODE- 250/637: Performed by: OBSTETRICS & GYNECOLOGY

## 2017-07-12 PROCEDURE — 85025 COMPLETE CBC W/AUTO DIFF WBC: CPT | Performed by: OBSTETRICS & GYNECOLOGY

## 2017-07-12 PROCEDURE — 74011250636 HC RX REV CODE- 250/636: Performed by: OBSTETRICS & GYNECOLOGY

## 2017-07-12 RX ORDER — HYDROMORPHONE HYDROCHLORIDE 1 MG/ML
0.5 INJECTION, SOLUTION INTRAMUSCULAR; INTRAVENOUS; SUBCUTANEOUS
Status: COMPLETED | OUTPATIENT
Start: 2017-07-12 | End: 2017-07-12

## 2017-07-12 RX ORDER — ONDANSETRON 2 MG/ML
4 INJECTION INTRAMUSCULAR; INTRAVENOUS
Status: DISCONTINUED | OUTPATIENT
Start: 2017-07-12 | End: 2017-07-13 | Stop reason: HOSPADM

## 2017-07-12 RX ADMIN — SIMETHICONE 80 MG: 80 TABLET, CHEWABLE ORAL at 07:05

## 2017-07-12 RX ADMIN — HYDROMORPHONE HYDROCHLORIDE 0.5 MG: 1 INJECTION, SOLUTION INTRAMUSCULAR; INTRAVENOUS; SUBCUTANEOUS at 08:43

## 2017-07-12 RX ADMIN — IBUPROFEN 800 MG: 400 TABLET, FILM COATED ORAL at 02:47

## 2017-07-12 RX ADMIN — OXYCODONE HYDROCHLORIDE AND ACETAMINOPHEN 1 TABLET: 5; 325 TABLET ORAL at 23:15

## 2017-07-12 RX ADMIN — SIMETHICONE 80 MG: 80 TABLET, CHEWABLE ORAL at 18:33

## 2017-07-12 RX ADMIN — IBUPROFEN 800 MG: 400 TABLET, FILM COATED ORAL at 11:40

## 2017-07-12 RX ADMIN — DOCUSATE SODIUM 100 MG: 100 CAPSULE, LIQUID FILLED ORAL at 10:01

## 2017-07-12 RX ADMIN — SIMETHICONE 80 MG: 80 TABLET, CHEWABLE ORAL at 13:35

## 2017-07-12 RX ADMIN — OXYCODONE HYDROCHLORIDE AND ACETAMINOPHEN 1 TABLET: 5; 325 TABLET ORAL at 18:59

## 2017-07-12 RX ADMIN — OXYCODONE HYDROCHLORIDE AND ACETAMINOPHEN 1 TABLET: 5; 325 TABLET ORAL at 15:17

## 2017-07-12 RX ADMIN — DOCUSATE SODIUM 100 MG: 100 CAPSULE, LIQUID FILLED ORAL at 18:33

## 2017-07-12 RX ADMIN — OXYCODONE HYDROCHLORIDE AND ACETAMINOPHEN 1 TABLET: 5; 325 TABLET ORAL at 10:59

## 2017-07-12 RX ADMIN — IBUPROFEN 800 MG: 400 TABLET, FILM COATED ORAL at 19:00

## 2017-07-12 RX ADMIN — OXYCODONE HYDROCHLORIDE AND ACETAMINOPHEN 1 TABLET: 5; 325 TABLET ORAL at 06:40

## 2017-07-12 RX ADMIN — SIMETHICONE 80 MG: 80 TABLET, CHEWABLE ORAL at 23:15

## 2017-07-12 NOTE — PROGRESS NOTES
Bedside and Verbal shift change report given to JONNATHAN Díaz RN (oncoming nurse) by LEEANN Guevara (offgoing nurse). Report included the following information SBAR, Kardex, OR Summary, Procedure Summary, Intake/Output, MAR and Recent Results.

## 2017-07-12 NOTE — ROUTINE PROCESS
Bedside and Verbal shift change report given to E. Burnadette Dandy (oncoming nurse) by Michael Monahan. Damien Zuniga (offgoing nurse). Report included the following information SBAR.

## 2017-07-12 NOTE — PROGRESS NOTES
Post-Operative  Day 2    Kailyn Cooper     Assessment: Post-Op day 2, doing well    Plan:   1. Routine post-operative care  2. Uncontrolled postoperative pain - improved with iv dilaudid. Exam with mild distention but bowel sounds present. Vitals normal. Labs done with normal wbc, hgb stable at 9.1 from 8.2, normal Cr, mild elevation in AST at 44. Op note reviewed, difficult delivery of head but no other complications. Will continue to monitor vitals and exam. Continue simethicone, nsaids, percocet. If persistent pain could consider imaging. Repeat labs tomorrow. 3. Preeclampsia without severe features, bps normal, labs normal other than ast as noted above, continue to monitor  4. Desires circ - reviewed risks of bleeding, infection, damage to surrounding structures, need for additional procedures or revision      Information for the patient's :  Shelly Sullivan [[id number]]   , Low Transverse   Patient doing well without significant complaint. Nausea and vomiting resolved, tolerating liquids, passing flatus, voiding and ambulating without difficulty. Vitals:  Visit Vitals    /73    Pulse 78    Temp 98.4 °F (36.9 °C)    Resp 20    Ht 5' 5\" (1.651 m)    Wt 82.1 kg (181 lb)    SpO2 96%    Breastfeeding Unknown    BMI 30.12 kg/m2     Temp (24hrs), Av.1 °F (36.7 °C), Min:97.4 °F (36.3 °C), Max:98.4 °F (36.9 °C)        Exam:        Patient without distress. Abdomen, bowel sounds present, soft, mild distention, expected tenderness, fundus firm                Wound incision clean, dry and intact               Lower extremities are negative for swelling, cords or tenderness.     Labs:   Lab Results   Component Value Date/Time    WBC 8.4 2017 08:41 AM    WBC 7.8 2017 03:50 AM    WBC 7.1 07/10/2017 11:38 AM    WBC 7.1 2013 02:43 PM    HGB 9.1 2017 08:41 AM    HGB 8.2 2017 03:50 AM    HGB 8.8 07/10/2017 11:38 AM    HGB 11.4 08/01/2013 02:43 PM    HCT 28.6 07/12/2017 08:41 AM    HCT 25.2 07/11/2017 03:50 AM    HCT 26.9 07/10/2017 11:38 AM    HCT 34.1 08/01/2013 02:43 PM    PLATELET 191 66/55/5306 08:41 AM    PLATELET 789 83/72/7067 03:50 AM    PLATELET 891 19/45/5598 11:38 AM    PLATELET 878 07/95/5395 02:43 PM       Recent Results (from the past 24 hour(s))   CBC WITH AUTOMATED DIFF    Collection Time: 07/12/17  8:41 AM   Result Value Ref Range    WBC 8.4 3.6 - 11.0 K/uL    RBC 3.42 (L) 3.80 - 5.20 M/uL    HGB 9.1 (L) 11.5 - 16.0 g/dL    HCT 28.6 (L) 35.0 - 47.0 %    MCV 83.6 80.0 - 99.0 FL    MCH 26.6 26.0 - 34.0 PG    MCHC 31.8 30.0 - 36.5 g/dL    RDW 15.5 (H) 11.5 - 14.5 %    PLATELET 108 (L) 934 - 400 K/uL    NEUTROPHILS 85 (H) 32 - 75 %    LYMPHOCYTES 7 (L) 12 - 49 %    MONOCYTES 8 5 - 13 %    EOSINOPHILS 0 0 - 7 %    BASOPHILS 0 0 - 1 %    ABS. NEUTROPHILS 7.1 1.8 - 8.0 K/UL    ABS. LYMPHOCYTES 0.6 (L) 0.8 - 3.5 K/UL    ABS. MONOCYTES 0.7 0.0 - 1.0 K/UL    ABS. EOSINOPHILS 0.0 0.0 - 0.4 K/UL    ABS. BASOPHILS 0.0 0.0 - 0.1 K/UL    RBC COMMENTS NORMOCYTIC, NORMOCHROMIC      DF SMEAR SCANNED     METABOLIC PANEL, COMPREHENSIVE    Collection Time: 07/12/17  8:41 AM   Result Value Ref Range    Sodium 137 136 - 145 mmol/L    Potassium 3.9 3.5 - 5.1 mmol/L    Chloride 105 97 - 108 mmol/L    CO2 25 21 - 32 mmol/L    Anion gap 7 5 - 15 mmol/L    Glucose 86 65 - 100 mg/dL    BUN 6 6 - 20 MG/DL    Creatinine 0.46 (L) 0.55 - 1.02 MG/DL    BUN/Creatinine ratio 13 12 - 20      GFR est AA >60 >60 ml/min/1.73m2    GFR est non-AA >60 >60 ml/min/1.73m2    Calcium 8.2 (L) 8.5 - 10.1 MG/DL    Bilirubin, total 1.1 (H) 0.2 - 1.0 MG/DL    ALT (SGPT) 19 12 - 78 U/L    AST (SGOT) 44 (H) 15 - 37 U/L    Alk.  phosphatase 121 (H) 45 - 117 U/L    Protein, total 6.2 (L) 6.4 - 8.2 g/dL    Albumin 2.4 (L) 3.5 - 5.0 g/dL    Globulin 3.8 2.0 - 4.0 g/dL    A-G Ratio 0.6 (L) 1.1 - 2.2

## 2017-07-12 NOTE — PROGRESS NOTES
0700 report received, pt writhing in bed with abdominal pain, encouraged warm shower and walking when it is able to be tolerated. 0730 Pt c/o nausea and abdominal discomfort that is progessively getting worse. no nausea meds ordered, pt unable to take another simethicone at this time. Michelle Files obtained, pt requested it for back. 0800  Bowel sounds auscultated, hypo/high pitched on right, none on left noted. Pt c/o sharp waves of pain in back and right side and all over abdomen. Slightly distended, more on the right side. Dr Simmons Pac called to come see pt.    9063 Dr Simmons Pac in to see pt, labs ordered, iv ordered for saline lock, and iv dilaudid ordered. Md auscultated bowel sounds. 0830 attempting to obtain another kpad for abdomen, Iv inserted in left ac by ANNALISE Morris RN.    9601 dilaudid given IV, Kpad placed on abdomen. 0915 pt resting more comfortably at this time, visitor feeding infant. Pt crying out with sharp pains intermittantly. 1000 colace given, encouraged warm fluids, warm shower, kpad and walking. Pt sitting up eating breakfast.  1115 Report to ANNALISE Sam

## 2017-07-13 VITALS
TEMPERATURE: 98.1 F | BODY MASS INDEX: 30.16 KG/M2 | WEIGHT: 181 LBS | HEIGHT: 65 IN | OXYGEN SATURATION: 96 % | DIASTOLIC BLOOD PRESSURE: 81 MMHG | RESPIRATION RATE: 16 BRPM | SYSTOLIC BLOOD PRESSURE: 134 MMHG | HEART RATE: 68 BPM

## 2017-07-13 PROBLEM — O14.90 PREECLAMPSIA: Status: RESOLVED | Noted: 2017-07-10 | Resolved: 2017-07-13

## 2017-07-13 PROBLEM — Z34.90 PREGNANCY: Status: RESOLVED | Noted: 2017-06-29 | Resolved: 2017-07-13

## 2017-07-13 LAB
ALBUMIN SERPL BCP-MCNC: 2 G/DL (ref 3.5–5)
ALBUMIN/GLOB SERPL: 0.6 {RATIO} (ref 1.1–2.2)
ALP SERPL-CCNC: 86 U/L (ref 45–117)
ALT SERPL-CCNC: 16 U/L (ref 12–78)
ANION GAP BLD CALC-SCNC: 7 MMOL/L (ref 5–15)
AST SERPL W P-5'-P-CCNC: 32 U/L (ref 15–37)
BASOPHILS # BLD AUTO: 0 K/UL (ref 0–0.1)
BASOPHILS # BLD: 0 % (ref 0–1)
BILIRUB SERPL-MCNC: 0.7 MG/DL (ref 0.2–1)
BUN SERPL-MCNC: 10 MG/DL (ref 6–20)
BUN/CREAT SERPL: 28 (ref 12–20)
CALCIUM SERPL-MCNC: 7.5 MG/DL (ref 8.5–10.1)
CHLORIDE SERPL-SCNC: 108 MMOL/L (ref 97–108)
CO2 SERPL-SCNC: 26 MMOL/L (ref 21–32)
CREAT SERPL-MCNC: 0.36 MG/DL (ref 0.55–1.02)
EOSINOPHIL # BLD: 0.1 K/UL (ref 0–0.4)
EOSINOPHIL NFR BLD: 2 % (ref 0–7)
ERYTHROCYTE [DISTWIDTH] IN BLOOD BY AUTOMATED COUNT: 15.6 % (ref 11.5–14.5)
GLOBULIN SER CALC-MCNC: 3.2 G/DL (ref 2–4)
GLUCOSE SERPL-MCNC: 85 MG/DL (ref 65–100)
HCT VFR BLD AUTO: 24 % (ref 35–47)
HGB BLD-MCNC: 7.7 G/DL (ref 11.5–16)
LYMPHOCYTES # BLD AUTO: 18 % (ref 12–49)
LYMPHOCYTES # BLD: 1.2 K/UL (ref 0.8–3.5)
MCH RBC QN AUTO: 27.2 PG (ref 26–34)
MCHC RBC AUTO-ENTMCNC: 32.1 G/DL (ref 30–36.5)
MCV RBC AUTO: 84.8 FL (ref 80–99)
MONOCYTES # BLD: 0.7 K/UL (ref 0–1)
MONOCYTES NFR BLD AUTO: 11 % (ref 5–13)
NEUTS SEG # BLD: 4.8 K/UL (ref 1.8–8)
NEUTS SEG NFR BLD AUTO: 69 % (ref 32–75)
PLATELET # BLD AUTO: 145 K/UL (ref 150–400)
POTASSIUM SERPL-SCNC: 3.5 MMOL/L (ref 3.5–5.1)
PROT SERPL-MCNC: 5.2 G/DL (ref 6.4–8.2)
RBC # BLD AUTO: 2.83 M/UL (ref 3.8–5.2)
SODIUM SERPL-SCNC: 141 MMOL/L (ref 136–145)
WBC # BLD AUTO: 6.8 K/UL (ref 3.6–11)

## 2017-07-13 PROCEDURE — 36415 COLL VENOUS BLD VENIPUNCTURE: CPT | Performed by: OBSTETRICS & GYNECOLOGY

## 2017-07-13 PROCEDURE — 85025 COMPLETE CBC W/AUTO DIFF WBC: CPT | Performed by: OBSTETRICS & GYNECOLOGY

## 2017-07-13 PROCEDURE — 74011250637 HC RX REV CODE- 250/637: Performed by: OBSTETRICS & GYNECOLOGY

## 2017-07-13 PROCEDURE — 80053 COMPREHEN METABOLIC PANEL: CPT | Performed by: OBSTETRICS & GYNECOLOGY

## 2017-07-13 RX ORDER — OXYCODONE AND ACETAMINOPHEN 5; 325 MG/1; MG/1
1-2 TABLET ORAL
Qty: 30 TAB | Refills: 0 | Status: SHIPPED | OUTPATIENT
Start: 2017-07-13 | End: 2019-02-10

## 2017-07-13 RX ORDER — IBUPROFEN 600 MG/1
600 TABLET ORAL
Qty: 30 TAB | Refills: 0 | Status: SHIPPED | OUTPATIENT
Start: 2017-07-13 | End: 2018-07-08

## 2017-07-13 RX ORDER — DOCUSATE SODIUM 100 MG/1
100 CAPSULE, LIQUID FILLED ORAL 2 TIMES DAILY
Qty: 60 CAP | Refills: 2 | Status: SHIPPED | OUTPATIENT
Start: 2017-07-13 | End: 2017-10-11

## 2017-07-13 RX ORDER — CYANOCOBALAMIN (VITAMIN B-12) 1000 MCG
1 TABLET, EXTENDED RELEASE ORAL DAILY
Qty: 30 TAB | Refills: 2 | Status: SHIPPED | OUTPATIENT
Start: 2017-07-13

## 2017-07-13 RX ADMIN — OXYCODONE HYDROCHLORIDE AND ACETAMINOPHEN 1 TABLET: 5; 325 TABLET ORAL at 02:56

## 2017-07-13 RX ADMIN — IBUPROFEN 800 MG: 400 TABLET, FILM COATED ORAL at 11:35

## 2017-07-13 RX ADMIN — OXYCODONE HYDROCHLORIDE AND ACETAMINOPHEN 1 TABLET: 5; 325 TABLET ORAL at 11:35

## 2017-07-13 RX ADMIN — OXYCODONE HYDROCHLORIDE AND ACETAMINOPHEN 1 TABLET: 5; 325 TABLET ORAL at 07:06

## 2017-07-13 RX ADMIN — SIMETHICONE 80 MG: 80 TABLET, CHEWABLE ORAL at 08:25

## 2017-07-13 RX ADMIN — IBUPROFEN 800 MG: 400 TABLET, FILM COATED ORAL at 02:56

## 2017-07-13 NOTE — DISCHARGE INSTRUCTIONS
POST DELIVERY DISCHARGE INSTRUCTIONS    Name: Carlyn Ling  YOB: 1980  Primary Diagnosis: Active Problems:    Preeclampsia (7/10/2017)        General:     Diet/Diet Restrictions:  · Eight 8-ounce glasses of fluid daily (water, juices); avoid excessive caffeine intake. · Meals/snacks as desired which are high in fiber and carbohydrates and low in fat and cholesterol. Medications:         Physical Activity / Restrictions / Safety:     · Avoid heavy lifting, no more that 8 lbs. For 2-3 weeks;   · Limit use of stairs to 2 times daily for the first week home. · No driving for one week. · Avoid intercourse 4-6 weeks, no douching or tampon use. · Check with obstetrician before starting or resuming an exercise program.      Discharge Instructions/Special Treatment/Home Care Needs:     · Continue prenatal vitamins. · Continue to use squirt bottle with warm water on your episiotomy after each bathroom use until bleeding stops. · If steri-strips applied to your incision, remove in 7-10 days. Call your doctor for the following:     · Fever over 101 degrees by mouth. · Vaginal bleeding heavier than a normal menstrual period or clots larger than a golf ball. · Red streaks or increased swelling of legs, painful red streaks on your breast.  · Painful urination, constipation and increased pain or swelling or discharge with your incision. · If you feel extremely anxious or overwhelmed. · If you have thoughts of harming yourself and/or your baby. Pain Management:     · Take Acetaminophen (Tylenol) or Ibuprofen (Advil, Motrin), as directed for pain. · Use a warm Sitz bath 3 times daily to relieve episiotomy or hemorrhoidal discomfort. · For hemorrhoidal discomfort, use Tucks and Anusol cream as needed and directed. · Heating pad to  incision as needed.      Follow-Up Care:     Appointment with MD:   Follow-up Appointments   Procedures    FOLLOW UP VISIT Appointment in: One Week For blood pressure check and 6 weeks for postpartum check with Dr Tessa Doe     For blood pressure check and 6 weeks for postpartum check with Dr Tessa Doe     Standing Status:   Standing     Number of Occurrences:   1     Order Specific Question:   Appointment in     Answer: One Week     Telephone number: [phone]    Signed By: Justin Patel MD                                                                                                   Date: 2017 Time: 8:47 AM    MyChart Activation    Thank you for requesting access to [facility]. Please follow the instructions below to securely access and download your online medical record. [facility] allows you to send messages to your doctor, view your test results, renew your prescriptions, schedule appointments, and more. How Do I Sign Up? 1. In your internet browser, go to [email or web address]. 2. Click on the First Time User? Click Here link in the Sign In box. You will see the New Member Sign Up page. 3. Enter your [facility] Access Code exactly as it appears below. You will not need to use this code after youve completed the sign-up process. If you do not sign up before the expiration date, you must request a new code. [facility] Access Code: [email or web address]  Expires: 2017  3:59 PM (This is the date your [facility] access code will )    4. Enter the last four digits of your Social Security Number (xxxx) and Date of Birth (mm/dd/yyyy) as indicated and click Submit. You will be taken to the next sign-up page. 5. Create a [facility] ID. This will be your [facility] login ID and cannot be changed, so think of one that is secure and easy to remember. 6. Create a [facility] password. You can change your password at any time. 7. Enter your Password Reset Question and Answer. This can be used at a later time if you forget your password. 8. Enter your e-mail address.  You will receive e-mail notification when new information is available in [employer]. 9. Click Sign Up. You can now view and download portions of your medical record. 10. Click the Download Summary menu link to download a portable copy of your medical information. Additional Information    If you have questions, please visit the Frequently Asked Questions section of the [employer] website at [email or web address] Remember, [employer] is NOT to be used for urgent needs. For medical emergencies, dial 911.

## 2017-07-13 NOTE — DISCHARGE SUMMARY
Obstetrical Discharge Summary     Name: Charleen Colunga MRN: [id number]  SSN: [id number]    YOB: 1980  Age: 31 y.o. Sex: female      Admit Date: 7/10/2017    Discharge Date: 2017     Admitting Physician: Amelia Nicole MD     Attending Physician:  Amelia Nicole MD     Admission Diagnoses: Repeat C/Section Preeclampsia/eclampsia  Preeclampsia, third trimester    Discharge Diagnoses:   Information for the patient's :  Moraima Bui [[id number]]   Delivery of a 3.465 kg male infant via , Low Transverse on 7/10/2017 at 4:19 PM  by . Apgars were 8 and 9. Additional Diagnoses:   Hospital Problems  Date Reviewed: 7/10/2017          Codes Class Noted POA    Preeclampsia ICD-10-CM: O14.90  ICD-9-CM: 642.40  7/10/2017 Unknown             Lab Results   Component Value Date/Time    Rubella, External Immune 2016    GrBStrep, External Positive 06/15/2017       Hospital Course: Normal hospital course following the delivery. Disposition at Discharge: Home or self care    Discharged Condition: Stable    Patient Instructions:   Current Discharge Medication List      START taking these medications    Details   ibuprofen (MOTRIN) 600 mg tablet Take 1 Tab by mouth every six (6) hours as needed for Pain for up to 360 days. Qty: 30 Tab, Refills: 0      oxyCODONE-acetaminophen (PERCOCET) 5-325 mg per tablet Take 1-2 Tabs by mouth every four (4) hours as needed for Pain. Max Daily Amount: 12 Tabs. Qty: 30 Tab, Refills: 0      docusate sodium (COLACE) 100 mg capsule Take 1 Cap by mouth two (2) times a day for 90 days. Qty: 60 Cap, Refills: 2      iron, carbonyl (FEOSOL) 45 mg tab Take 1 Tab by mouth daily. Qty: 30 Tab, Refills: 2         CONTINUE these medications which have NOT CHANGED    Details   PNV38-Iron Cbn&Gluc-FA-DSS-DHA 35-1- mg cmpk Take  by mouth. Reference my discharge instructions.     Follow-up Appointments   Procedures    FOLLOW UP VISIT Appointment in: One Week For blood pressure check and 6 weeks for postpartum check with Dr Anette Pond     For blood pressure check and 6 weeks for postpartum check with Dr Anette Pond     Standing Status:   Standing     Number of Occurrences:   1     Order Specific Question:   Appointment in     Answer:    One Week        Signed By:  Altaf Hernadez MD     July 13, 2017

## 2017-07-13 NOTE — PROGRESS NOTES
Bedside and Verbal shift change report given to KARLA Delgado RN  (oncoming nurse) by LEEANN Stevens (offgoing nurse). Report included the following information SBAR, Kardex, OR Summary, Procedure Summary, Intake/Output, MAR and Recent Results.

## 2017-07-13 NOTE — LACTATION NOTE
This note was copied from a baby's chart. Infant has all formula feeds in past 24 hours. Mom's plan was breast and formula. Mom pumped on 7/11 getting some EBM. Mom aware of breastpump benefits via insurance and has lactation resource number for discharge needs.

## 2017-07-13 NOTE — PROGRESS NOTES
Post-Operative  Day 3    Kailyn Cooper       Assessment: Post-Op day 3, doing well    Plan:   1. Discharge home today  2. Follow up in office in 6 weeks with Jus Castro MD  3. Post partum activity/wound care advised, diet as tolerated  4. Discharge Medications: ibuprofen, percocet and medications prior to admission  5. Pre-eclampsia with normal BPs postpartum, LFTs mildly elevated yesterday-normal today, follow up for BP check in office next week  6. Acute on chronic anemia- asymptomatic, no evidence of active bleeding- home on iron      Information for the patient's :  Kamran Flanagan [[id number]]   , Low Transverse   Patient doing well without significant complaint. Tolerating diet, passing flatus, voiding and ambulating without difficulty. She denies feeling lightheaded or dizzy. No CP/SOB. Vitals:  Visit Vitals    /81 (BP 1 Location: Right arm, BP Patient Position: Sitting)    Pulse 68    Temp 98.1 °F (36.7 °C)    Resp 16    Ht 5' 5\" (1.651 m)    Wt 82.1 kg (181 lb)    SpO2 96%    Breastfeeding Unknown    BMI 30.12 kg/m2     Temp (24hrs), Av.1 °F (36.7 °C), Min:97.8 °F (36.6 °C), Max:98.4 °F (36.9 °C)        Exam:        Patient without distress. Abdomen, bowel sounds present, soft, expected tenderness, fundus firm                Wound incision clean, dry and intact               Lower extremities are negative for swelling, cords or tenderness.     Labs:   Lab Results   Component Value Date/Time    WBC 6.8 2017 04:55 AM    WBC 8.4 2017 08:41 AM    WBC 7.8 2017 03:50 AM    WBC 7.1 07/10/2017 11:38 AM    WBC 7.1 2013 02:43 PM    HGB 7.7 2017 04:55 AM    HGB 9.1 2017 08:41 AM    HGB 8.2 2017 03:50 AM    HGB 8.8 07/10/2017 11:38 AM    HGB 11.4 2013 02:43 PM    HCT 24.0 2017 04:55 AM    HCT 28.6 2017 08:41 AM    HCT 25.2 2017 03:50 AM    HCT 26.9 07/10/2017 11:38 AM    HCT 34.1 08/01/2013 02:43 PM    PLATELET 656 58/49/2756 04:55 AM    PLATELET 835 83/31/9573 08:41 AM    PLATELET 104 43/42/7803 03:50 AM    PLATELET 660 99/72/1761 11:38 AM    PLATELET 013 48/12/0067 02:43 PM       Recent Results (from the past 24 hour(s))   CBC WITH AUTOMATED DIFF    Collection Time: 07/12/17  8:41 AM   Result Value Ref Range    WBC 8.4 3.6 - 11.0 K/uL    RBC 3.42 (L) 3.80 - 5.20 M/uL    HGB 9.1 (L) 11.5 - 16.0 g/dL    HCT 28.6 (L) 35.0 - 47.0 %    MCV 83.6 80.0 - 99.0 FL    MCH 26.6 26.0 - 34.0 PG    MCHC 31.8 30.0 - 36.5 g/dL    RDW 15.5 (H) 11.5 - 14.5 %    PLATELET 562 (L) 343 - 400 K/uL    NEUTROPHILS 85 (H) 32 - 75 %    LYMPHOCYTES 7 (L) 12 - 49 %    MONOCYTES 8 5 - 13 %    EOSINOPHILS 0 0 - 7 %    BASOPHILS 0 0 - 1 %    ABS. NEUTROPHILS 7.1 1.8 - 8.0 K/UL    ABS. LYMPHOCYTES 0.6 (L) 0.8 - 3.5 K/UL    ABS. MONOCYTES 0.7 0.0 - 1.0 K/UL    ABS. EOSINOPHILS 0.0 0.0 - 0.4 K/UL    ABS. BASOPHILS 0.0 0.0 - 0.1 K/UL    RBC COMMENTS NORMOCYTIC, NORMOCHROMIC      DF SMEAR SCANNED     METABOLIC PANEL, COMPREHENSIVE    Collection Time: 07/12/17  8:41 AM   Result Value Ref Range    Sodium 137 136 - 145 mmol/L    Potassium 3.9 3.5 - 5.1 mmol/L    Chloride 105 97 - 108 mmol/L    CO2 25 21 - 32 mmol/L    Anion gap 7 5 - 15 mmol/L    Glucose 86 65 - 100 mg/dL    BUN 6 6 - 20 MG/DL    Creatinine 0.46 (L) 0.55 - 1.02 MG/DL    BUN/Creatinine ratio 13 12 - 20      GFR est AA >60 >60 ml/min/1.73m2    GFR est non-AA >60 >60 ml/min/1.73m2    Calcium 8.2 (L) 8.5 - 10.1 MG/DL    Bilirubin, total 1.1 (H) 0.2 - 1.0 MG/DL    ALT (SGPT) 19 12 - 78 U/L    AST (SGOT) 44 (H) 15 - 37 U/L    Alk.  phosphatase 121 (H) 45 - 117 U/L    Protein, total 6.2 (L) 6.4 - 8.2 g/dL    Albumin 2.4 (L) 3.5 - 5.0 g/dL    Globulin 3.8 2.0 - 4.0 g/dL    A-G Ratio 0.6 (L) 1.1 - 2.2     CBC WITH AUTOMATED DIFF    Collection Time: 07/13/17  4:55 AM   Result Value Ref Range    WBC 6.8 3.6 - 11.0 K/uL    RBC 2.83 (L) 3.80 - 5.20 M/uL    HGB 7.7 (L) 11.5 - 16.0 g/dL    HCT 24.0 (L) 35.0 - 47.0 %    MCV 84.8 80.0 - 99.0 FL    MCH 27.2 26.0 - 34.0 PG    MCHC 32.1 30.0 - 36.5 g/dL    RDW 15.6 (H) 11.5 - 14.5 %    PLATELET 992 (L) 527 - 400 K/uL    NEUTROPHILS 69 32 - 75 %    LYMPHOCYTES 18 12 - 49 %    MONOCYTES 11 5 - 13 %    EOSINOPHILS 2 0 - 7 %    BASOPHILS 0 0 - 1 %    ABS. NEUTROPHILS 4.8 1.8 - 8.0 K/UL    ABS. LYMPHOCYTES 1.2 0.8 - 3.5 K/UL    ABS. MONOCYTES 0.7 0.0 - 1.0 K/UL    ABS. EOSINOPHILS 0.1 0.0 - 0.4 K/UL    ABS. BASOPHILS 0.0 0.0 - 0.1 K/UL   METABOLIC PANEL, COMPREHENSIVE    Collection Time: 07/13/17  4:55 AM   Result Value Ref Range    Sodium 141 136 - 145 mmol/L    Potassium 3.5 3.5 - 5.1 mmol/L    Chloride 108 97 - 108 mmol/L    CO2 26 21 - 32 mmol/L    Anion gap 7 5 - 15 mmol/L    Glucose 85 65 - 100 mg/dL    BUN 10 6 - 20 MG/DL    Creatinine 0.36 (L) 0.55 - 1.02 MG/DL    BUN/Creatinine ratio 28 (H) 12 - 20      GFR est AA >60 >60 ml/min/1.73m2    GFR est non-AA >60 >60 ml/min/1.73m2    Calcium 7.5 (L) 8.5 - 10.1 MG/DL    Bilirubin, total 0.7 0.2 - 1.0 MG/DL    ALT (SGPT) 16 12 - 78 U/L    AST (SGOT) 32 15 - 37 U/L    Alk.  phosphatase 86 45 - 117 U/L    Protein, total 5.2 (L) 6.4 - 8.2 g/dL    Albumin 2.0 (L) 3.5 - 5.0 g/dL    Globulin 3.2 2.0 - 4.0 g/dL    A-G Ratio 0.6 (L) 1.1 - 2.2

## 2017-07-21 ENCOUNTER — HOSPITAL ENCOUNTER (OUTPATIENT)
Dept: FAMILY PLANNING/WOMEN'S HEALTH CLINIC | Age: 37
Discharge: HOME | End: 2017-07-21
Payer: COMMERCIAL

## 2017-07-21 PROCEDURE — 99211 OFF/OP EST MAY X REQ PHY/QHP: CPT

## 2017-07-21 NOTE — PROGRESS NOTES
[facility] [facility]  [address] [place] [address] Drive, [place] [facility] [address]  Dept: [phone]    LACTATION REPORT TO HEALTHCARE PROVIDER    Date: 2017    Dear Donald Newberry MD: Aletha Duke: This is to inform you that I have seen    Baby name (First Name, Last Name): Helen Meigs                                  Mother: Toño Butt    Reason for Visit: difficulty latching; Other (Comment) (low supply)    Baby date of birth: [birth year]     Baby's Gestational age: 40  Birth Weight (Born Outside of [place]): 3.465 kg (7 lb 10.2 oz)     Discharge weight: 3.165 kg (6 lb 15.6 oz)     Date                  Weight        Recorded Weight Date 1: 17  Recorded Weight 1: 3.218 kg (7 lb 1.5 oz)        Recorded  Weight Date 2: 17  Recorded Weight 2: 3.218 kg (7 lb 1.5 oz)    Pre-feed Weight: 3.246 kg (7 lb 2.5 oz)  Post-feed Weight Left Breast: (P) 3.263 kg (7 lb 3.1 oz)  Post-feed Weight Right Breast: 3.272 kg (7 lb 3.4 oz)     Total amount of milk transferred: Total Weight Gain: 0.9 oz    Total time of feedin     Amount of milk/formula supplemented: 0    Breast Assessment:  Left Breast: Medium  Left Nipple: Everted; Intact  Right Breast: Medium  Right Nipple: Everted; Intact    Oral assessement:  (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 Mother in for assistance with feeding. Reports baby will latch for a few minutes then pulls off and refuses to latch again. Is pumping for 10 minutes every 2 to 3 hours with a Symphony breast pump with 24 mm breast shields. Was getting 2 ounces now only about 1 ounce total. Has ordered fenugreek, but not started. Observed baby at breast and reviewed good latch and positioning techniques: mother reports that she did not see lactation in the hospital until discharge day and did not get much assistance with BF while there.  Baby initially refusing breast, nipple shield placed and mother instructed in how to apply shield properly. Infant fed well with good notable swallows for 15 minutes. Intake on first side 0.6 oz. Went to second side without shield, mother taught breast compression when he stopped suckling or swallowing and he responded with good rhythmic suckling for another 20 minutes, for an additional intake of 0.3 oz for a total of 0.9. Discussed plan to get supply increased with herbal supplements, frequent feeding at breast, using shield if needed  Followed by pumping for 20 minutes after BF, and continued supplementing with ebm or formula 1 to 2 ounces after BF. Follow up here in 10 days for repeat pre and post feed weight check. Enclosed please find a copy of the instructions given to the patient. Follow-up/Consultation scheduled on[de-identified] (P) 07/31/17    Please feel free to contact me at [facility] with any questions or concerns.     Thank you,    Tierra Mcrae RN IBCLC

## 2017-07-21 NOTE — DISCHARGE INSTRUCTIONS
HELPING BABY LATCH CORRECTLY                                             1. Stimulate the rooting reflex by making a circular motion around the lips, in one direction. Give baby verbal and visual cues by saying Leanora Shallow and demonstrating with your face approximately 10 inches away from babys face to elicit a wide, open mouth. 2. Stimulate let down by massaging or pumping breasts for 1-2 minutes prior to latching. 3. Allow baby to suck clean finger, placed nail side down. 4. Place nursing pillow or bed pillows next to you high enough to bring baby to the level of the nipple. 5. Position baby belly to belly with you, making sure that head, neck and torso are in alignment. 6. Support babys head during latch. Babys body weight should rest comfortably on nursing pillow. 7. Roll the nipple gently between the fingers to make it stand out. 8. Fingers should be placed at the edge of areola and the breast should be compressed so that it enters parallel to babys open mouth, with the nipple slightly pointed toward the roof of the babys mouth. (Think of compressing a large sandwich to fit into your mouth!)   9. Wait for WIDE mouth, and then bring the baby to the breast, getting as much of the areola into the mouth as possible. Gentle downward pressure on the lower jaw as baby is coming to breast, may be helpful in keeping mouth open wide initially. 10. Hold the breast compressed and the baby gently in place to allow the baby to feel the nipple and begin suckling.  (U-hold may be helpful)  11. If baby has not latched correctly, begin the process again. 12. If baby is latched correctly, lips should be flanged outward, nose and chin should lightly   touch the breast.  No clicking or lip smacking should be heard. 13. Swallowing should be observed intermittently during entire feeding. Baby should pause   for breathing.   14. It is normal to have some brief discomfort initially when the baby latches, but it should spike as the feeding progresses, particularly once the milk lets down. 15. If baby tends to compress nipple during feedings, un-latch after first let-down, roll nipples into round shape and re-latch. Breastfeeding instructions    · Follow up: 10 days at [facility]  · Eat at least 3 well-balanced meals per day with snacks and drink to thirst.  · Sleep when baby sleeps  · Watch for babys natural feeding cues: e.g. sucking on fist, rooting. Dont allow baby to become too hungry. Skin to skin contact with baby as much as possible during day. · Attempt to feed baby every 2-3 hours for minimum of 8 times per day, maximum 12 times per day. Special Instructions:  Express a little milk before attempting to latch to entice baby then offer nipple shield if baby will not latch to nipple. · Nurse baby on first breast until he/she no longer swallows, even with breast compression, then offer second breast.  · Pumping Instructions: Pump both breasts for 20 minutes after feedings and anytime a bottle is given in place of a breast feeding. · Feed baby 1 to 2 ounce(s) of expressed breast milk / formula of pediatricians choice after breastfeeding(s). ·  If baby doesn't breast feed give 2 oz at each feeding. · Use wide based nipple/slow flow nipple such as Miriam/TommyTippee/Julietkin Latch/Calma. View video of paced bottle feeding at www.[employer]. [employer] Knights  Or  http://[employer]. [employer]/paced-bottle-feeding/  · Keep written record of feeding times and babys urine and stool output. · Call pediatrician if baby:  1. Has less than 6 wet / 3-4 stools in 24 hours (newborns over 6 days)  2. Has concentrated, strong smelling urine  3. Is lethargic or not feeding  4. Has forceful spitting  5. Is extremely fussy or colicky and cannot be calmed  6. Is jaundiced / color is yellow  7. Is running a fever  8. Blood or mucous in the stool  9.  Any concerns      INFORMATION ON TONGUE TIE:    Tongue-tie is an anatomical abnormality affecting the ability of the tongue to move in an appropriate way to facilitate proper suckling at the breast. This may affect either the ability to stick the tongue out or to lift the tongue upward or both. Tongue-tie can sometimes be seen when the baby raises the tongue and sometimes can on ly be felt by an experienced medical professional on exam.    Niranjan Aliment can contribute to nipple pain and damage, ineffective breast emptying, tiring easily at the breast, short sleep cycles, reflux and colic, lip blisters, and poor weight gain. In a majority of cases,  tongue tie is accompanied by lip tie as well which prevents the upper lip from flanging out properly. Almost all tongue-tie and lip-tie can be treated with a simple medical procedure to release the tie. This procedure should be performed by an experienced medical professional. Charo Roberts refers clients suspected of tongue or lip tie to Dr. Karen Yeager at [address] [place] [address], and [facility] located at [address] [place] [address], [address] [place] [address]. [phone]    For additional information on tongue and lip tie and post procedure exercises go to [facility]. Natural Galactagogues    Mother's Milk Tea (Traditional Medicinals or Yogi brand)   · Steep bag in water for at least 10 minutes  · Can make hot or cold  · Can add sweetener or mix with any decaf, non- peppermint tea or lemonade  · May drink 2-6 cups per day    Lactiful Supply Max capsules         Available online LactTheCrowd or Amazon         Take 5 capsules 3 times daily (for a total of 15 per day) preferably with a meal         Do not take if allergic to peanuts or chick-peas or plants in the ros family         14 day money back Guarantee         Take as directed on package         Consult with physician before starting any supplement    More Milk Plus by Motherlove (Tincture or Capsules)        Take 1 capsule 4X/day or if over 175 lbs., 2 capsules 3 X day Consult with physician before starting any supplement         Available at [employer] or on line    More Milk Plus Special Blend (Tincture of Capsules)        Take 1 capsule 4 X day or if over 175 lbs. 2 capsules 3 X day         Consult with physician before starting any supplement    Lactation Plus by [employer] (capsules)         Take as directed on package          Available at most Target stores         Consult with physician before starting any supplement    Mother's Milk Cookie Recipes  · SurgiQuest.au. html    · DeathPrevention.it    INCREASING MILK SUPPLY    A [address] follows the recommendations of the Academy of Breastfeeding Medicine and International Lactation Consultant Association for increasing milk supply. These recommendations are based upon current evidence and apply to women experiencing difficulties with a low rate of milk production. · Skin to skin contact between mother and baby encourages frequent feeding and stimulates the release of hormones that help the milk to let down. · Lactating women need to maintain adequate calorie and fluid intake and rest when baby is sleeping. Try to take in an additional 500 calories per day per baby. Drink to thirst but it is not necessary to force fluids. · Self breast massage prior to feeding and breast compression while the baby is nursing may help to increase the intake of breast milk. Massage, light stroking toward the nipple and gentle shaking while leaning forward when baby is not latched on may help stimulate a second let down. · Relaxation techniques such as deep breathing, meditation, soft music and dim lighting may help to facilitate let down. · Increase stimulation and milk removal by feeding frequently and latching baby deeply to promote thorough drainage of the breasts.  Pump if baby is not latching. (Minimum 8 feedings/pumpings in a 24 hour period and 12 times maximum)  · Pumping for 10-15 minutes after breastfeeding with an automatic cycling breast pump that is capable of draining both breasts, hospital grade if available, will increase stimulation of the breasts and increase milk production. It may take several (2-3) days before you notice an increase. The pump should be adjusted to the highest maximum comfortable vacuum to enhance milk flow and milk yield. Flange size is important so your nipple can move freely in the flange as not to cause the nipple to swell. Graduate vacuum suction to feel like babys suck. You can use the letdown button several times during your pumping session to promote more efficient drainage of the breast.  · Hand expression after pumping may increase milk yield and fat content of milk. You may find it helpful to view the video demonstrating several hand expression techniques found at: http://newborns. [employer].[facility]/Breastfeeding/MaxProduction. html  · Anemia, thyroid malfunction, polycystic ovary syndrome and hormonal birth control such as the mini pill or Depo-provera can impact production in some women and may need to be evaluated by your healthcare provider. · If taking thyroid medicine, thyroid levels should be periodically checked and medicine adjusted as needed. · Contact your healthcare provider regarding pharmacological or herbal products to increase milk supply. · Avoid products that contain peppermint (Altoids, Starlight mints) and luba. Call [facility] with any questions or concerns. Call Pediatrician to schedule next well baby check.

## 2017-07-31 ENCOUNTER — HOSPITAL ENCOUNTER (OUTPATIENT)
Dept: FAMILY PLANNING/WOMEN'S HEALTH CLINIC | Age: 37
Discharge: HOME | End: 2017-07-31
Payer: COMMERCIAL

## 2017-07-31 PROCEDURE — 99212 OFFICE O/P EST SF 10 MIN: CPT

## 2017-07-31 NOTE — DISCHARGE INSTRUCTIONS
Baby's Name:  Thaddeus Seat Weight / Amount Date/Location     Birth Weight  7 lb 10.2 oz  [birth year]/10/17    Discharge Weight  6 lb 15.6 oz     Weight Check  7 lb 1.5 oz  07/19/17    Last Weight  7 lb 2.5 oz   [facility] 07/21/17    Today's Weight - Pre-feed  7 lb 9.3 oz     Today's Weight - Post-feed  7 lb 9.9 oz       Total Amount of Milk Transferred  0.7 oz  0.1 oz spit up between sides. Notes:  Baby noted to have poor suck which did improve with deeper asymmetric latch, mother able to return demonstrate good technique. Still not adequate drawing in of breast tissue noted. Nipple noted to be round on release when latched deeply. 7 ounce gain in 9 days ; Baby BF then takes 2 or 3 ounce supplement of ebm/formula every 2  hours day and night. Spits up after every feeding sometimes several times up to an hour after feeding. Discussed formula being used, cows milk products in mother's diet, taking in too much volume, inadequate burping all as possible explanations. Encouraged to discuss with Pediatrician including sibling's history of severe reflux. Also suggested watching U--tube video on Paced Bottle Feeding. Reassessed oral anatomy and labial lip tie noted as well as questionable lateral ties as well felt. Lower lip tie may be present as well. Posterior tongue tie again noted as well, which does limit ability of tongue to be pressed up toward roof of mouth. Mother's supply has increased since renting Symphony pump and pumping after every feeding. Reviewed information for Dr. Gracia Nunes and suggested evaluation, then follow up at [facility] if indicated. Encouraged to continue frequent feeding at breast followed by 1 1/2 to 2 ounces of breastmilk or formula.           Next weight check   Date Location    Tuesday August 1  Pediatrician

## 2017-08-17 ENCOUNTER — HOSPITAL ENCOUNTER (OUTPATIENT)
Dept: GENERAL RADIOLOGY | Age: 37
Discharge: HOME | End: 2017-08-17
Payer: COMMERCIAL

## 2017-08-17 DIAGNOSIS — Z79.899 ENCOUNTER FOR DRUG THERAPY: ICD-10-CM

## 2017-08-17 PROCEDURE — 71020 XR CHEST PA LAT: CPT

## 2018-07-26 LAB
HBSAG, EXTERNAL: NORMAL
HIV, EXTERNAL: NON REACTIVE
RUBELLA, EXTERNAL: NORMAL
T. PALLIDUM, EXTERNAL: NEGATIVE

## 2019-02-01 ENCOUNTER — HOSPITAL ENCOUNTER (OUTPATIENT)
Age: 39
Discharge: HOME | End: 2019-02-02
Attending: OBSTETRICS & GYNECOLOGY | Admitting: OBSTETRICS & GYNECOLOGY
Payer: COMMERCIAL

## 2019-02-01 LAB
BASOPHILS # BLD: 0 K/UL (ref 0–0.1)
BASOPHILS NFR BLD: 0 % (ref 0–1)
DIFFERENTIAL METHOD BLD: ABNORMAL
EOSINOPHIL # BLD: 0 K/UL (ref 0–0.4)
EOSINOPHIL NFR BLD: 0 % (ref 0–7)
ERYTHROCYTE [DISTWIDTH] IN BLOOD BY AUTOMATED COUNT: 15.8 % (ref 11.5–14.5)
HCT VFR BLD AUTO: 26.4 % (ref 35–47)
HGB BLD-MCNC: 8 G/DL (ref 11.5–16)
IMM GRANULOCYTES # BLD AUTO: 0 K/UL (ref 0–0.04)
IMM GRANULOCYTES NFR BLD AUTO: 1 % (ref 0–0.5)
LYMPHOCYTES # BLD: 1.4 K/UL (ref 0.8–3.5)
LYMPHOCYTES NFR BLD: 19 % (ref 12–49)
MCH RBC QN AUTO: 25.5 PG (ref 26–34)
MCHC RBC AUTO-ENTMCNC: 30.3 G/DL (ref 30–36.5)
MCV RBC AUTO: 84.1 FL (ref 80–99)
MONOCYTES # BLD: 0.6 K/UL (ref 0–1)
MONOCYTES NFR BLD: 8 % (ref 5–13)
NEUTS SEG # BLD: 5.4 K/UL (ref 1.8–8)
NEUTS SEG NFR BLD: 72 % (ref 32–75)
NRBC # BLD: 0 K/UL (ref 0–0.01)
NRBC BLD-RTO: 0 PER 100 WBC
PLATELET # BLD AUTO: 190 K/UL (ref 150–400)
PMV BLD AUTO: 12 FL (ref 8.9–12.9)
RBC # BLD AUTO: 3.14 M/UL (ref 3.8–5.2)
WBC # BLD AUTO: 7.4 K/UL (ref 3.6–11)

## 2019-02-01 PROCEDURE — 80053 COMPREHEN METABOLIC PANEL: CPT

## 2019-02-01 PROCEDURE — 36415 COLL VENOUS BLD VENIPUNCTURE: CPT

## 2019-02-01 PROCEDURE — 59025 FETAL NON-STRESS TEST: CPT

## 2019-02-01 PROCEDURE — 85025 COMPLETE CBC W/AUTO DIFF WBC: CPT

## 2019-02-02 VITALS
RESPIRATION RATE: 16 BRPM | WEIGHT: 179 LBS | TEMPERATURE: 98 F | HEIGHT: 65 IN | SYSTOLIC BLOOD PRESSURE: 148 MMHG | OXYGEN SATURATION: 98 % | HEART RATE: 78 BPM | BODY MASS INDEX: 29.82 KG/M2 | DIASTOLIC BLOOD PRESSURE: 82 MMHG

## 2019-02-02 LAB
ALBUMIN SERPL-MCNC: 2.4 G/DL (ref 3.5–5)
ALBUMIN/GLOB SERPL: 0.6 {RATIO} (ref 1.1–2.2)
ALP SERPL-CCNC: 119 U/L (ref 45–117)
ALT SERPL-CCNC: 13 U/L (ref 12–78)
ANION GAP SERPL CALC-SCNC: 10 MMOL/L (ref 5–15)
AST SERPL-CCNC: 25 U/L (ref 15–37)
BILIRUB SERPL-MCNC: 0.9 MG/DL (ref 0.2–1)
BUN SERPL-MCNC: 5 MG/DL (ref 6–20)
BUN/CREAT SERPL: 12 (ref 12–20)
CALCIUM SERPL-MCNC: 7.4 MG/DL (ref 8.5–10.1)
CHLORIDE SERPL-SCNC: 109 MMOL/L (ref 97–108)
CO2 SERPL-SCNC: 20 MMOL/L (ref 21–32)
CREAT SERPL-MCNC: 0.43 MG/DL (ref 0.55–1.02)
GLOBULIN SER CALC-MCNC: 4 G/DL (ref 2–4)
GLUCOSE SERPL-MCNC: 105 MG/DL (ref 65–100)
POTASSIUM SERPL-SCNC: 3.9 MMOL/L (ref 3.5–5.1)
PROT SERPL-MCNC: 6.4 G/DL (ref 6.4–8.2)
SODIUM SERPL-SCNC: 139 MMOL/L (ref 136–145)

## 2019-02-02 PROCEDURE — 59025 FETAL NON-STRESS TEST: CPT

## 2019-02-02 PROCEDURE — 99285 EMERGENCY DEPT VISIT HI MDM: CPT

## 2019-02-02 NOTE — PROGRESS NOTES
Received pt from home for elevated blood pressure readings,  prev c/sx2 with PE 35w3d pt of Dr Ramses Darden, has been having elevated Bp since last week with office check-ups, EFMX2 applied. consent up

## 2019-02-02 NOTE — H&P
History & Physical 
 
Name: Reyes Myles MRN: [id number]  SSN: [id number] YOB: 1980  Age: 41 y.o. Sex: female Subjective:  
 
Estimated Date of Delivery: 3/5/19 OB History  Para Term  AB Living 3 2 1     1 SAB TAB Ectopic Molar Multiple Live Births 0 1 # Outcome Date GA Lbr Pasquale/2nd Weight Sex Delivery Anes PTL Lv  
3 Current 2 Term 07/10/17 37w0d  3.465 kg M CS-LTranv SPINAL AN N LENORA  
1 Para Ms. Liza Vega is admitted for observation with pregnancy at 35w4d for c/o increased BPs at home with systolics in the 603-723F. She denies HA,blurry vision,RUQ/epigastric pain,decreased FM,vaginal bleeding, ROM, or contractions. Prenatal course was complicated by mild preeclampsia being managed as an outpatient. Labs have been normal with exception of prt/cr ratio of 0.4. Please see prenatal records for details. Past Medical History:  
Diagnosis Date  Abnormal Papanicolaou smear of cervix  Anemia  Essential hypertension Past Surgical History:  
Procedure Laterality Date   DELIVERY ONLY    
 due to preeclampsia  HX OTHER SURGICAL  2000  
 appendectomy Social History Occupational History  Not on file Tobacco Use  Smoking status: Former Smoker  Smokeless tobacco: Never Used Substance and Sexual Activity  Alcohol use: No  
  Comment: 1 x wk  Drug use: No  
 Sexual activity: Yes  
  Partners: Male Family History Problem Relation Age of Onset  Cancer Father  Heart Attack Maternal Grandfather  Cancer Paternal Grandmother No Known Allergies Prior to Admission medications Medication Sig Start Date End Date Taking? Authorizing Provider  
oxyCODONE-acetaminophen (PERCOCET) 5-325 mg per tablet Take 1-2 Tabs by mouth every four (4) hours as needed for Pain. Max Daily Amount: 12 Tabs.  17   Medardo Xie MD  
 iron, carbonyl (FEOSOL) 45 mg tab Take 1 Tab by mouth daily. 7/13/17   Christel Boss MD  
[id number]-Qgyb Cbn&Gluc-FA-DSS-DHA 35-1- mg cmpk Take  by mouth. Provider, Historical  
  
 
Review of Systems Constitutional: Negative for chills, diaphoresis and fever. Eyes: Negative for photophobia and visual disturbance. Respiratory: Negative for cough, chest tightness, shortness of breath and wheezing. Cardiovascular: Negative for chest pain, palpitations and leg swelling. Gastrointestinal: Negative for blood in stool, constipation, diarrhea, nausea and vomiting. Genitourinary: Positive for pelvic pain. Negative for dysuria, frequency, genital sores, hematuria and vaginal bleeding. Musculoskeletal: Negative for arthralgias, back pain, myalgias and neck pain. Neurological: Negative for seizures and headaches. Psychiatric/Behavioral: Negative for agitation, behavioral problems and confusion. Objective:  
 
Vitals: 
Vitals:  
 02/02/19 0020 02/02/19 1700 02/02/19 0050 02/02/19 0105 BP: 138/69 128/60 130/69 141/68 Pulse: 77 72 75 71 Resp:      
Temp:      
SpO2:      
Weight:      
Height:      
  
 
Physical Exam  
Constitutional: She is oriented to person, place, and time. She appears well-developed and well-nourished. No distress. HENT:  
Head: Normocephalic and atraumatic. Eyes: EOM are normal. No scleral icterus. Neck: Normal range of motion. Neck supple. No JVD present. No tracheal deviation present. No thyromegaly present. Cardiovascular: Normal rate, regular rhythm and normal heart sounds. Exam reveals no gallop and no friction rub. No murmur heard. Pulmonary/Chest: Effort normal and breath sounds normal. No respiratory distress. She has no wheezes. She has no rales. Abdominal: Soft. Bowel sounds are normal. She exhibits no distension. There is no tenderness. There is no rebound and no guarding. Musculoskeletal: Normal range of motion. She exhibits no edema, tenderness or deformity. Lymphadenopathy:  
  She has no cervical adenopathy. Neurological: She is alert and oriented to person, place, and time. She displays normal reflexes. She exhibits normal muscle tone. Coordination normal.  
Skin: Skin is warm and dry. No rash noted. She is not diaphoretic. No erythema. No pallor. Psychiatric: She has a normal mood and affect. Her behavior is normal. Judgment and thought content normal.  
Back: Neg CVAT Cervical Exam: deferred Uterine Activity: None Membranes: Intact Fetal Heart Rate: Reactive Prenatal Labs:  
Lab Results Component Value Date/Time ABO/Rh(D) A POSITIVE 07/10/2017 11:38 AM  
 Rubella, External Immune 12/21/2016 GrBStrep, External Positive 06/15/2017 HBsAg, External Negative 12/21/2016 HIV, External Non Reactive 12/21/2016 Gonorrhea, External Negative 12/21/2016 Chlamydia, External Negative 12/21/2016 ABO,Rh A Positive 12/21/2016 Impression/Plan:  
 
 1)Mild preeclampsia. R/O severe. Plan: Admit for observation,serial BPs,collection of 24 hr urine,labs. Plan per result. Signed By:  Beryl Osgood, MD   
 February 2, 2019

## 2019-02-02 NOTE — DISCHARGE INSTRUCTIONS
Patient Education        High Blood Pressure in Pregnancy: Care Instructions  Your Care Instructions    High blood pressure (hypertension) means that the force of blood against your artery walls is too strong. Mild high blood pressure during pregnancy is not usually dangerous. Your doctor will probably just want to watch you closely. But when blood pressure is very high, it can reduce oxygen to your baby. This can affect how well your baby grows. High blood pressure also means that you are at higher risk for:  · Preeclampsia. This is a problem that includes high blood pressure and damage to your liver or kidneys. It can also reduce how much oxygen your baby gets. In some cases, it leads to eclampsia. Eclampsia causes seizures. · Placental abruption. This is a problem when the placenta separates from the uterus before birth. It prevents the baby from getting enough oxygen and nutrients. Sometimes it can cause death for the baby and the mother. To prevent problems for you or your baby, you will have to check your blood pressure often. You will do this until after your baby is born. If your blood pressure rises suddenly or is very high during your pregnancy, your doctor may prescribe medicines. They can usually control blood pressure. If your blood pressure affects your or your baby's health, your doctor may need to deliver your baby early. After your baby is born, your blood pressure will probably improve. But sometimes blood pressure problems continue after birth. Follow-up care is a key part of your treatment and safety. Be sure to make and go to all appointments, and call your doctor if you are having problems. It's also a good idea to know your test results and keep a list of the medicines you take. How can you care for yourself at home? · Take and write down your blood pressure at home if your doctor tells you to. · Take your medicines exactly as prescribed.  Call your doctor if you think you are having a problem with your medicine. · Do not smoke. If you need help quitting, talk to your doctor about stop-smoking programs and medicines. These can increase your chances of quitting for good. · Do not gain too much weight during your pregnancy. Talk to your doctor about how much weight gain is healthy. · Eat a healthy diet. · If your doctor says it's okay, get regular exercise. Walking or swimming several times a week can be healthy for you and your baby. · Reduce stress, and find time to relax. This is very important if you continue to work or have a busy schedule. It's also important if you have small children at home. When should you call for help? Call 911 anytime you think you may need emergency care. For example, call if:    · You passed out (lost consciousness).     · You have a seizure.    Call your doctor now or seek immediate medical care if:    · You have symptoms of preeclampsia, such as:  ? Sudden swelling of your face, hands, or feet. ? New vision problems (such as dimness or blurring). ? A severe headache.     · Your blood pressure is higher than it should be or rises suddenly.     · You have new nausea or vomiting.     · You think that you are in labor.     · You have pain in your belly or pelvis.    Watch closely for changes in your health, and be sure to contact your doctor if:    · You gain weight rapidly. Where can you learn more? Go to http://oscar-omar.info/. Enter [phone] in the search box to learn more about \"High Blood Pressure in Pregnancy: Care Instructions. \"  Current as of: September 5, 2018  Content Version: 11.9  © 7867-0599 [employer]. Care instructions adapted under license by [employer] (which disclaims liability or warranty for this information).  If you have questions about a medical condition or this instruction, always ask your healthcare professional. [address] any warranty or liability for your use of this information.        CONTINUE COLLECTING URINE FOR 24 HOUR URINE UNTIL 1115 PM FARTUN    FOLLOW UP IN THE OFFICE WITH DR RIVERA ON Monday 2/4/19    CALL YOUR DOCTOR IF YOU HAVE ANY OF THE ABOVE SIGNS OR SYMPTOMS

## 2019-02-02 NOTE — PROGRESS NOTES
Antepartum  Discharge Summary Patient ID: 
Kat Cade [id number] 
81 y.o. 
1980 Admit date: 2/1/2019 Discharge date: 2/2/2019 Admission Diagnoses:   
Patient Active Problem List  
Diagnosis Code  
(none) - all problems resolved or deleted Discharge Diagnoses: There are no discharge diagnoses documented for the most recent discharge. Patient Active Problem List  
Diagnosis Code  
(none) - all problems resolved or deleted Procedures for this admission:  
 
Hospital Course: Patient presented to L&D with complaints of increased blood pressure on her home BP monitor, 160/100. She is being followed as an outpatient for mild preeclampsia, with a plan to deliver at ~37 weeks if no signs or symptoms of severe preeclampsia develop. She had no other symptoms of severe preeclampsia and remained asymptomatic over her 12+ hours in observation. Her BP ranged from a low of 128/60 to a high of 156/89. Labs were normal without any findings to suggest severe preeclampsia. Disposition: home to complete 24 hr urine Discharged Condition: stable Patient plans to return for changes in her condition or the condition of the baby or for delivery of the baby. Patient Instructions:  
Cannot display discharge medications since this patient is not currently admitted. Activity: Activity as tolerated and rest at home as much as possible Diet: Regular Diet Follow-up with Dr. Nate Bravo in the office on 2/4 as scheduled.   
 
Signed: 
Elidia Smith MD 
2/2/2019 
10:36 AM

## 2019-02-02 NOTE — PROGRESS NOTES
0715 Bedside report received from KLARISSA Kebede RN using SBAR and recent events. 0905 Pt tolerated reg diet, cont to deny any pih s&sx's. On efm x 2, ts 140,  
 
0910 Dr Dwayne Hayward at bedside to assess pt and review poc. 
 
302 Marilin Anglin Discharge instructions reviewed, copy to pt. Questions answered. 0945 Discharged home to complete 24 hour urine.

## 2019-02-04 LAB — GRBS, EXTERNAL: NEGATIVE

## 2019-02-07 ENCOUNTER — ANESTHESIA EVENT (OUTPATIENT)
Dept: LABOR AND DELIVERY | Age: 39
End: 2019-02-07
Payer: COMMERCIAL

## 2019-02-07 ENCOUNTER — ANESTHESIA (OUTPATIENT)
Dept: LABOR AND DELIVERY | Age: 39
End: 2019-02-07
Payer: COMMERCIAL

## 2019-02-07 ENCOUNTER — HOSPITAL ENCOUNTER (INPATIENT)
Age: 39
LOS: 3 days | Discharge: HOME | End: 2019-02-10
Attending: OBSTETRICS & GYNECOLOGY | Admitting: OBSTETRICS & GYNECOLOGY
Payer: COMMERCIAL

## 2019-02-07 DIAGNOSIS — O13.9 GESTATIONAL HYPERTENSION, ANTEPARTUM: Primary | ICD-10-CM

## 2019-02-07 LAB
ALBUMIN SERPL-MCNC: 2.4 G/DL (ref 3.5–5)
ALBUMIN/GLOB SERPL: 0.6 {RATIO} (ref 1.1–2.2)
ALP SERPL-CCNC: 117 U/L (ref 45–117)
ALT SERPL-CCNC: 12 U/L (ref 12–78)
ANION GAP SERPL CALC-SCNC: 9 MMOL/L (ref 5–15)
AST SERPL-CCNC: 17 U/L (ref 15–37)
BILIRUB SERPL-MCNC: 0.9 MG/DL (ref 0.2–1)
BUN SERPL-MCNC: 6 MG/DL (ref 6–20)
BUN/CREAT SERPL: 14 (ref 12–20)
CALCIUM SERPL-MCNC: 7.9 MG/DL (ref 8.5–10.1)
CHLORIDE SERPL-SCNC: 107 MMOL/L (ref 97–108)
CO2 SERPL-SCNC: 21 MMOL/L (ref 21–32)
CREAT SERPL-MCNC: 0.44 MG/DL (ref 0.55–1.02)
ERYTHROCYTE [DISTWIDTH] IN BLOOD BY AUTOMATED COUNT: 16.1 % (ref 11.5–14.5)
GLOBULIN SER CALC-MCNC: 3.9 G/DL (ref 2–4)
GLUCOSE SERPL-MCNC: 91 MG/DL (ref 65–100)
HCT VFR BLD AUTO: 23.9 % (ref 35–47)
HGB BLD-MCNC: 7.3 G/DL (ref 11.5–16)
MCH RBC QN AUTO: 24.7 PG (ref 26–34)
MCHC RBC AUTO-ENTMCNC: 30.5 G/DL (ref 30–36.5)
MCV RBC AUTO: 80.7 FL (ref 80–99)
NRBC # BLD: 0 K/UL (ref 0–0.01)
NRBC BLD-RTO: 0 PER 100 WBC
PLATELET # BLD AUTO: 123 K/UL (ref 150–400)
PMV BLD AUTO: 11.9 FL (ref 8.9–12.9)
POTASSIUM SERPL-SCNC: 3.5 MMOL/L (ref 3.5–5.1)
PROT SERPL-MCNC: 6.3 G/DL (ref 6.4–8.2)
RBC # BLD AUTO: 2.96 M/UL (ref 3.8–5.2)
SODIUM SERPL-SCNC: 137 MMOL/L (ref 136–145)
WBC # BLD AUTO: 5.8 K/UL (ref 3.6–11)

## 2019-02-07 PROCEDURE — 75410000002 HC LABOR FEE PER 1 HR

## 2019-02-07 PROCEDURE — 74011250636 HC RX REV CODE- 250/636: Performed by: ANESTHESIOLOGY

## 2019-02-07 PROCEDURE — 77030010547 HC BG URIN W/UMETER -A

## 2019-02-07 PROCEDURE — 65410000002 HC RM PRIVATE OB

## 2019-02-07 PROCEDURE — 76060000033 HC ANESTHESIA 1 TO 1.5 HR: Performed by: OBSTETRICS & GYNECOLOGY

## 2019-02-07 PROCEDURE — 85027 COMPLETE CBC AUTOMATED: CPT

## 2019-02-07 PROCEDURE — 77030027138 HC INCENT SPIROMETER -A

## 2019-02-07 PROCEDURE — 77030033269 HC SLV COMPR SCD KNE2 CARD -B

## 2019-02-07 PROCEDURE — 76010000392 HC C SECN EA ADDL 0.5 HR: Performed by: OBSTETRICS & GYNECOLOGY

## 2019-02-07 PROCEDURE — 74011250636 HC RX REV CODE- 250/636

## 2019-02-07 PROCEDURE — 77030018836 HC SOL IRR NACL ICUM -A

## 2019-02-07 PROCEDURE — 77010026065 HC OXYGEN MINIMUM MEDICAL AIR

## 2019-02-07 PROCEDURE — 77030014125 HC TY EPDRL BBMI -B: Performed by: ANESTHESIOLOGY

## 2019-02-07 PROCEDURE — 74011250636 HC RX REV CODE- 250/636: Performed by: OBSTETRICS & GYNECOLOGY

## 2019-02-07 PROCEDURE — 77030008459 HC STPLR SKN COOP -B

## 2019-02-07 PROCEDURE — 76060000078 HC EPIDURAL ANESTHESIA: Performed by: OBSTETRICS & GYNECOLOGY

## 2019-02-07 PROCEDURE — 86923 COMPATIBILITY TEST ELECTRIC: CPT

## 2019-02-07 PROCEDURE — 77030002943 HC SUT MXN COVD -A

## 2019-02-07 PROCEDURE — 76060000078 HC EPIDURAL ANESTHESIA

## 2019-02-07 PROCEDURE — 36415 COLL VENOUS BLD VENIPUNCTURE: CPT

## 2019-02-07 PROCEDURE — 77030035236 HC SUT PDS STRATFX BARB J&J -B

## 2019-02-07 PROCEDURE — 75410000000 HC DELIVERY VAGINAL/SINGLE

## 2019-02-07 PROCEDURE — 77030031139 HC SUT VCRL2 J&J -A

## 2019-02-07 PROCEDURE — 77030018809 HC RETRCTR ALXSO DISP AMR -B

## 2019-02-07 PROCEDURE — 86900 BLOOD TYPING SEROLOGIC ABO: CPT

## 2019-02-07 PROCEDURE — 77030002933 HC SUT MCRYL J&J -A

## 2019-02-07 PROCEDURE — 74011000250 HC RX REV CODE- 250

## 2019-02-07 PROCEDURE — 76010000391 HC C SECN FIRST 1 HR: Performed by: OBSTETRICS & GYNECOLOGY

## 2019-02-07 PROCEDURE — 80053 COMPREHEN METABOLIC PANEL: CPT

## 2019-02-07 PROCEDURE — 75410000003 HC RECOV DEL/VAG/CSECN EA 0.5 HR

## 2019-02-07 RX ORDER — SODIUM CHLORIDE 9 MG/ML
250 INJECTION, SOLUTION INTRAVENOUS AS NEEDED
Status: DISCONTINUED | OUTPATIENT
Start: 2019-02-07 | End: 2019-02-07

## 2019-02-07 RX ORDER — NALOXONE HYDROCHLORIDE 0.4 MG/ML
0.4 INJECTION, SOLUTION INTRAMUSCULAR; INTRAVENOUS; SUBCUTANEOUS AS NEEDED
Status: DISCONTINUED | OUTPATIENT
Start: 2019-02-07 | End: 2019-02-10 | Stop reason: HOSPADM

## 2019-02-07 RX ORDER — DIPHENHYDRAMINE HYDROCHLORIDE 50 MG/ML
12.5 INJECTION, SOLUTION INTRAMUSCULAR; INTRAVENOUS
Status: DISCONTINUED | OUTPATIENT
Start: 2019-02-07 | End: 2019-02-07

## 2019-02-07 RX ORDER — NALOXONE HYDROCHLORIDE 0.4 MG/ML
0.2 INJECTION, SOLUTION INTRAMUSCULAR; INTRAVENOUS; SUBCUTANEOUS
Status: DISCONTINUED | OUTPATIENT
Start: 2019-02-07 | End: 2019-02-07

## 2019-02-07 RX ORDER — SODIUM CHLORIDE 0.9 % (FLUSH) 0.9 %
5-40 SYRINGE (ML) INJECTION EVERY 8 HOURS
Status: DISCONTINUED | OUTPATIENT
Start: 2019-02-07 | End: 2019-02-07

## 2019-02-07 RX ORDER — SODIUM CHLORIDE 0.9 % (FLUSH) 0.9 %
5-40 SYRINGE (ML) INJECTION EVERY 8 HOURS
Status: DISCONTINUED | OUTPATIENT
Start: 2019-02-07 | End: 2019-02-10 | Stop reason: HOSPADM

## 2019-02-07 RX ORDER — OXYTOCIN/RINGER'S LACTATE 20/1000 ML
PLASTIC BAG, INJECTION (ML) INTRAVENOUS
Status: COMPLETED
Start: 2019-02-07 | End: 2019-02-07

## 2019-02-07 RX ORDER — OXYTOCIN/RINGER'S LACTATE 20/1000 ML
PLASTIC BAG, INJECTION (ML) INTRAVENOUS
Status: DISCONTINUED | OUTPATIENT
Start: 2019-02-07 | End: 2019-02-07 | Stop reason: HOSPADM

## 2019-02-07 RX ORDER — IBUPROFEN 400 MG/1
800 TABLET ORAL EVERY 8 HOURS
Status: DISCONTINUED | OUTPATIENT
Start: 2019-02-07 | End: 2019-02-10 | Stop reason: HOSPADM

## 2019-02-07 RX ORDER — SODIUM CHLORIDE, SODIUM LACTATE, POTASSIUM CHLORIDE, CALCIUM CHLORIDE 600; 310; 30; 20 MG/100ML; MG/100ML; MG/100ML; MG/100ML
999 INJECTION, SOLUTION INTRAVENOUS CONTINUOUS
Status: DISCONTINUED | OUTPATIENT
Start: 2019-02-08 | End: 2019-02-10 | Stop reason: HOSPADM

## 2019-02-07 RX ORDER — BUPIVACAINE HYDROCHLORIDE 7.5 MG/ML
INJECTION, SOLUTION EPIDURAL; RETROBULBAR AS NEEDED
Status: DISCONTINUED | OUTPATIENT
Start: 2019-02-07 | End: 2019-02-07 | Stop reason: HOSPADM

## 2019-02-07 RX ORDER — SODIUM CHLORIDE 0.9 % (FLUSH) 0.9 %
5-40 SYRINGE (ML) INJECTION AS NEEDED
Status: DISCONTINUED | OUTPATIENT
Start: 2019-02-07 | End: 2019-02-07

## 2019-02-07 RX ORDER — ONDANSETRON 2 MG/ML
INJECTION INTRAMUSCULAR; INTRAVENOUS AS NEEDED
Status: DISCONTINUED | OUTPATIENT
Start: 2019-02-07 | End: 2019-02-07 | Stop reason: HOSPADM

## 2019-02-07 RX ORDER — OXYCODONE AND ACETAMINOPHEN 5; 325 MG/1; MG/1
1 TABLET ORAL
Status: DISCONTINUED | OUTPATIENT
Start: 2019-02-07 | End: 2019-02-10 | Stop reason: HOSPADM

## 2019-02-07 RX ORDER — KETOROLAC TROMETHAMINE 30 MG/ML
30 INJECTION, SOLUTION INTRAMUSCULAR; INTRAVENOUS
Status: DISCONTINUED | OUTPATIENT
Start: 2019-02-07 | End: 2019-02-07

## 2019-02-07 RX ORDER — MAGNESIUM SULFATE HEPTAHYDRATE 40 MG/ML
INJECTION, SOLUTION INTRAVENOUS
Status: COMPLETED
Start: 2019-02-07 | End: 2019-02-07

## 2019-02-07 RX ORDER — PHENYLEPHRINE HCL IN 0.9% NACL 0.4MG/10ML
SYRINGE (ML) INTRAVENOUS AS NEEDED
Status: DISCONTINUED | OUTPATIENT
Start: 2019-02-07 | End: 2019-02-07 | Stop reason: HOSPADM

## 2019-02-07 RX ORDER — OXYTOCIN 10 [USP'U]/ML
INJECTION, SOLUTION INTRAMUSCULAR; INTRAVENOUS
Status: DISCONTINUED
Start: 2019-02-07 | End: 2019-02-07 | Stop reason: WASHOUT

## 2019-02-07 RX ORDER — ACETAMINOPHEN 10 MG/ML
INJECTION, SOLUTION INTRAVENOUS AS NEEDED
Status: DISCONTINUED | OUTPATIENT
Start: 2019-02-07 | End: 2019-02-07 | Stop reason: HOSPADM

## 2019-02-07 RX ORDER — SODIUM CHLORIDE 0.9 % (FLUSH) 0.9 %
5-40 SYRINGE (ML) INJECTION AS NEEDED
Status: DISCONTINUED | OUTPATIENT
Start: 2019-02-07 | End: 2019-02-10 | Stop reason: HOSPADM

## 2019-02-07 RX ORDER — MORPHINE SULFATE 0.5 MG/ML
INJECTION, SOLUTION EPIDURAL; INTRATHECAL; INTRAVENOUS AS NEEDED
Status: DISCONTINUED | OUTPATIENT
Start: 2019-02-07 | End: 2019-02-07 | Stop reason: HOSPADM

## 2019-02-07 RX ORDER — SODIUM CHLORIDE, SODIUM LACTATE, POTASSIUM CHLORIDE, CALCIUM CHLORIDE 600; 310; 30; 20 MG/100ML; MG/100ML; MG/100ML; MG/100ML
INJECTION, SOLUTION INTRAVENOUS
Status: DISCONTINUED | OUTPATIENT
Start: 2019-02-07 | End: 2019-02-07 | Stop reason: HOSPADM

## 2019-02-07 RX ORDER — SIMETHICONE 80 MG
80 TABLET,CHEWABLE ORAL AS NEEDED
Status: DISCONTINUED | OUTPATIENT
Start: 2019-02-07 | End: 2019-02-10 | Stop reason: HOSPADM

## 2019-02-07 RX ORDER — CEFAZOLIN SODIUM/WATER 2 G/20 ML
2 SYRINGE (ML) INTRAVENOUS ONCE
Status: COMPLETED | OUTPATIENT
Start: 2019-02-07 | End: 2019-02-07

## 2019-02-07 RX ORDER — SODIUM CHLORIDE, SODIUM LACTATE, POTASSIUM CHLORIDE, CALCIUM CHLORIDE 600; 310; 30; 20 MG/100ML; MG/100ML; MG/100ML; MG/100ML
1000 INJECTION, SOLUTION INTRAVENOUS CONTINUOUS
Status: DISCONTINUED | OUTPATIENT
Start: 2019-02-07 | End: 2019-02-07

## 2019-02-07 RX ORDER — PROCHLORPERAZINE EDISYLATE 5 MG/ML
5 INJECTION INTRAMUSCULAR; INTRAVENOUS
Status: DISCONTINUED | OUTPATIENT
Start: 2019-02-07 | End: 2019-02-07

## 2019-02-07 RX ORDER — SODIUM CHLORIDE, SODIUM LACTATE, POTASSIUM CHLORIDE, CALCIUM CHLORIDE 600; 310; 30; 20 MG/100ML; MG/100ML; MG/100ML; MG/100ML
125 INJECTION, SOLUTION INTRAVENOUS CONTINUOUS
Status: DISCONTINUED | OUTPATIENT
Start: 2019-02-07 | End: 2019-02-10 | Stop reason: HOSPADM

## 2019-02-07 RX ORDER — ZOLPIDEM TARTRATE 5 MG/1
5 TABLET ORAL
Status: DISCONTINUED | OUTPATIENT
Start: 2019-02-07 | End: 2019-02-10 | Stop reason: HOSPADM

## 2019-02-07 RX ORDER — MAGNESIUM SULFATE HEPTAHYDRATE 40 MG/ML
4 INJECTION, SOLUTION INTRAVENOUS ONCE
Status: ACTIVE | OUTPATIENT
Start: 2019-02-07 | End: 2019-02-08

## 2019-02-07 RX ORDER — CALCIUM GLUCONATE 94 MG/ML
INJECTION, SOLUTION INTRAVENOUS
Status: DISPENSED
Start: 2019-02-07 | End: 2019-02-08

## 2019-02-07 RX ORDER — KETOROLAC TROMETHAMINE 30 MG/ML
30 INJECTION, SOLUTION INTRAMUSCULAR; INTRAVENOUS
Status: DISPENSED | OUTPATIENT
Start: 2019-02-07 | End: 2019-02-08

## 2019-02-07 RX ADMIN — Medication 80 MCG: at 18:18

## 2019-02-07 RX ADMIN — BUPIVACAINE HYDROCHLORIDE 10.5 MG: 7.5 INJECTION, SOLUTION EPIDURAL; RETROBULBAR at 17:48

## 2019-02-07 RX ADMIN — SODIUM CHLORIDE, SODIUM LACTATE, POTASSIUM CHLORIDE, AND CALCIUM CHLORIDE 1000 ML: 600; 310; 30; 20 INJECTION, SOLUTION INTRAVENOUS at 17:35

## 2019-02-07 RX ADMIN — ONDANSETRON 4 MG: 2 INJECTION INTRAMUSCULAR; INTRAVENOUS at 17:51

## 2019-02-07 RX ADMIN — Medication: at 18:21

## 2019-02-07 RX ADMIN — KETOROLAC TROMETHAMINE 30 MG: 30 INJECTION, SOLUTION INTRAMUSCULAR at 19:50

## 2019-02-07 RX ADMIN — SODIUM CHLORIDE, SODIUM LACTATE, POTASSIUM CHLORIDE, AND CALCIUM CHLORIDE 999 ML/HR: 600; 310; 30; 20 INJECTION, SOLUTION INTRAVENOUS at 23:00

## 2019-02-07 RX ADMIN — MAGNESIUM SULFATE 2 G/HR: 500 INJECTION, SOLUTION INTRAMUSCULAR; INTRAVENOUS at 19:28

## 2019-02-07 RX ADMIN — MAGNESIUM SULFATE IN WATER 4 G: 40 INJECTION, SOLUTION INTRAVENOUS at 19:08

## 2019-02-07 RX ADMIN — Medication 20000 MILLI-UNITS: at 19:38

## 2019-02-07 RX ADMIN — Medication 2 G: at 17:50

## 2019-02-07 RX ADMIN — SODIUM CHLORIDE, SODIUM LACTATE, POTASSIUM CHLORIDE, CALCIUM CHLORIDE: 600; 310; 30; 20 INJECTION, SOLUTION INTRAVENOUS at 17:34

## 2019-02-07 RX ADMIN — ACETAMINOPHEN 1000 MG: 10 INJECTION, SOLUTION INTRAVENOUS at 18:24

## 2019-02-07 RX ADMIN — MORPHINE SULFATE 500 MCG: 0.5 INJECTION, SOLUTION EPIDURAL; INTRATHECAL; INTRAVENOUS at 17:48

## 2019-02-07 RX ADMIN — SODIUM CHLORIDE, SODIUM LACTATE, POTASSIUM CHLORIDE, AND CALCIUM CHLORIDE 500 ML: 600; 310; 30; 20 INJECTION, SOLUTION INTRAVENOUS at 23:04

## 2019-02-07 RX ADMIN — Medication 80 MCG: at 18:16

## 2019-02-07 RX ADMIN — SODIUM CHLORIDE, SODIUM LACTATE, POTASSIUM CHLORIDE, AND CALCIUM CHLORIDE 1000 ML: 600; 310; 30; 20 INJECTION, SOLUTION INTRAVENOUS at 16:50

## 2019-02-07 RX ADMIN — Medication 80 MCG: at 18:22

## 2019-02-07 NOTE — L&D DELIVERY NOTE
Delivery Summary  Patient: Karen Bragg             Circumcision:   NA-female  Additional Delivery Comments - RCS at 36 2/7 weeks for HELLP syndrome      Information for the patient's :  Maegan Singh [[id number]]       Labor Events:    Labor:     Rupture Date:     Rupture Time:     Rupture Type Intact   Amniotic Fluid Volume:      Amniotic Fluid Description:       Induction:         Augmentation:     Labor Events:       Cervical Ripening:           Delivery Events:  Episiotomy:     Laceration(s):       Repaired:      Number of Repair Packets:     Suture Type and Size:       Estimated Blood Loss (ml):  ml       Delivery Date: 2019    Delivery Time: 6:19 PM  Delivery Type: , Low Transverse  Sex:  Female     Gestational Age: [facility]   Delivery Clinician:  Kenton Aguilar  Living Status: Living   Delivery Location: OR            APGARS  One minute Five minutes Ten minutes   Skin color: 1   1        Heart rate: 2   2        Grimace: 2   2        Muscle tone: 2   2        Breathin   2        Totals: 9   9            Presentation: Vertex    Position:        Resuscitation Method:  Tactile Stimulation;Suctioning-bulb     Meconium Stained: None      Cord Information: 3 Vessels  Complications: None  Cord around:    Delayed cord clamping? Cord clamped date/time:   Disposition of Cord Blood:      Blood Gases Sent?:      Placenta:  Date/Time: 2019  6:22 PM  Removal: Manual Removal      Appearance: Intact; Normal     [place] Measurements:  Birth Weight: 3.135 kg      Birth Length: 50.8 cm      Head Circumference: 33 cm      Chest Circumference: 31.8 cm     Abdominal Girth: 31.8 cm    Other Providers:    , Obstetrician;Primary Nurse;Primary [place] Nurse;Nicu Nurse;Neonatologist;Anesthesiologist;Crna;Nurse Practitioner;Midwife;Nursery Nurse           Cord pH:  none    Episiotomy:     Laceration(s):       Estimated Blood Loss (ml):     Labor Events  Method:        Augmentation: Cervical Ripening:                Hospital Problems  Date Reviewed: 7/10/2017          Codes Class Noted POA    Gestational hypertension ICD-10-CM: O13.9  ICD-9-CM: 642.30  2019 Unknown              Operative Vaginal Delivery - none    Group B Strep:   Lab Results   Component Value Date/Time    GrBStrep, External negative 2019     Information for the patient's :  Bernarda Zaragoza [[id number]]   No results found for: ABORH, PCTABR, PCTDIG, BILI, ABORHEXT, ABORH    No results found for: APH, APCO2, APO2, AHCO3, ABEC, ABDC, O2ST, EPHV, PCO2V, PO2V, HCO3V, EBEV, EBDV, SITE, RSCOM

## 2019-02-07 NOTE — ANESTHESIA PREPROCEDURE EVALUATION
Anesthetic History No history of anesthetic complications Review of Systems / Medical History Patient summary reviewed, nursing notes reviewed and pertinent labs reviewed Pulmonary Within defined limits Neuro/Psych Within defined limits Cardiovascular Hypertension Exercise tolerance: >4 METS 
  
GI/Hepatic/Renal 
Within defined limits Endo/Other Within defined limits Other Findings Comments: Prev  , now with PIH/preeclampsia for repeat  Physical Exam 
 
Airway Mallampati: II 
TM Distance: 4 - 6 cm Neck ROM: normal range of motion Mouth opening: Normal 
 
 Cardiovascular Regular rate and rhythm,  S1 and S2 normal,  no murmur, click, rub, or gallop Dental 
No notable dental hx Pulmonary Breath sounds clear to auscultation Abdominal 
GI exam deferred Other Findings Anesthetic Plan ASA: 2, emergent Anesthesia type: spinal 
 
 
 
 
 
Anesthetic plan and risks discussed with: Patient and Family May proceed ASAP per OB despite pt not NPO for 8hrs due to risk to fetus and mom with severe HTN

## 2019-02-07 NOTE — OP NOTES
Operative Note    Name: Conner Doss Record Number: [id number]   YOB: 1980  Today's Date: 2019      Pre-operative Diagnosis: 1. 36 2/7 weeks gestation 2. HELLP syndrome 3. Prior  x 2 desiring Repeat     Post-operative Diagnosis: same    Operation: Repeat Low Transverse  Section    Surgeon(s):  MD Kim Oro MD    Anesthesia: Spinal    Prophylactic Antibiotics: Ancef  DVT Prophylaxis: Sequential Compression Devices         Fetal Description: menendez     Birth Information:   Information for the patient's :  Arnie Gonzalez [[id number]]   Delivery of a 3.135 kg female infant on 2019 at 6:19 PM. Apgars were 9  and 9 . Umbilical Cord:       Umbilical Cord Events:       Placenta:   removal with   appearance. Amniotic Fluid Volume:        Amniotic Fluid Description:           Placenta:  manual removal    Specimens: none           Complications:  none    EBL: 700 cc    Procedure Detail:      After proper patient identification and consent, the patient was taken to the operating room, where spinal anesthesia was administered and found to be adequate. Cano catheter had been placed using sterile technique. The patient was prepped and draped in the normal sterile fashion with SCDs in place. The abdomen was entered using the Pfannenstiel technique. The peritoneum was entered sharply well superior to the bladder without any apparent injury. The Roger self retaining retractor was placed. The bladder flap was created without difficulty. A low transverse uterine incision was made with the scalpel and extended with blunt finger dissection. The babys head was then delivered atraumatically. The nose and mouth were suctioned. The cord was clamped and cut and the baby was handed off to Nursing staff in attendance. Placenta was then removed from the uterus.  The uterus was curettaged with a moist lap pad and cleared of all clots and debris. The uterine incision was closed with 0 vicryl, double layer  in running locking fashion with good hemostasis assured. The anterior pelvis was irrigated with warm normal saline and good hemostasis was again reassured throughout. The retractor was removed. The peritoneum was reapproximated with 3-0 Vicryl. The fascia was closed with 0 Maxon in a running fashion. Good hemostasis was assured. The skin was closed with absorbable staples in a subcuticular fashion. The patient tolerated the procedure well. Sponge, lap, and needle counts were correct times three and the patient and baby were taken to recovery/postpartum room in stable condition.     Kenton Aguilar MD  February 7, 2019  6:48 PM

## 2019-02-07 NOTE — ROUTINE PROCESS
0- Pt was sent from the office for high BP. Pt is  GA 36/2 Monitors applied. Checking BPs q15min. 1545- CBC, CMP and sample sent to lab per order. Pt denies H/A, blurred vision or epigastric pain. Ankles with 2+ edema. 36- Dr Rhona Rodriguez was notified, updated on pt's status BPs labs and swelling. 26- Dr Rhona Rodriguez in talking to pt. CBC results shown to MD, PLT drop from 190 to 123 since 19. Plan to repeat suction pt. Pt verbalized understanding. Anesthesia notified. 174- Pt to OR. 
1800- Spinal completed. Pt tolerated procedure. FHR at 140s after Spinal.   
- Pt had a viable Female infant. - Procedure completed. Pt out of OR awake and alert transferred to [facility]- Magnesium Sulfate started per order. 0- Pt tolerated Bolus and rate changed to 2gms/hr. 2100- Recovery completed. Pt's condition stable. 5- Low urine output. Dr Shi Gonsalves notified, Order obtained for LR 500ml bolus. 2300- Bedside report to Baldomero Cooper RN given. Care turned over at this time.

## 2019-02-07 NOTE — H&P
History & Physical 
 
Name: Charleen Colunga MRN: [id number]  SSN: [id number] YOB: 1980  Age: 41 y.o. Sex: female Subjective:  
 
Reason for Admission:  Pregnancy and Pre-eclampsia History of Present Illness: Ms. Dola Landau is a 41 y.o.  female with an estimated gestational age of 37w1d with Estimated Date of Delivery: 3/5/19. She has a history of  x 2 and had pre-eclampsia with both of her prior pregnancies. She has been followed as an outpatient for pre-eclampsia without severe features. Three days ago her 24 hour urine protein was 993. She was seen in the office today for pre-charanjit visit and her BP was 170/80. She denies headache or vision changes or abdominal pain. US - vertex, EFW 79%, BPP 8/8, 8cm left lateral wall fibroid OB History  Para Term  AB Living 3 2 1     1 SAB TAB Ectopic Molar Multiple Live Births 0 1 # Outcome Date GA Lbr Pasquale/2nd Weight Sex Delivery Anes PTL Lv  
3 Current 2 Term 07/10/17 37w0d  3.465 kg M CS-LTranv SPINAL AN N LENORA  
1 Para Past Medical History:  
Diagnosis Date  Abnormal Papanicolaou smear of cervix  Anemia  Essential hypertension  Gestational hypertension 2019 Past Surgical History:  
Procedure Laterality Date   DELIVERY ONLY    
 due to preeclampsia  HX OTHER SURGICAL  2000  
 appendectomy Social History Occupational History  Not on file Tobacco Use  Smoking status: Former Smoker  Smokeless tobacco: Never Used Substance and Sexual Activity  Alcohol use: No  
  Comment: 1 x wk  Drug use: No  
 Sexual activity: Yes  
  Partners: Male Family History Problem Relation Age of Onset  Cancer Father  Heart Attack Maternal Grandfather  Cancer Paternal Grandmother No Known Allergies Prior to Admission medications Medication Sig Start Date End Date Taking? Authorizing Provider oxyCODONE-acetaminophen (PERCOCET) 5-325 mg per tablet Take 1-2 Tabs by mouth every four (4) hours as needed for Pain. Max Daily Amount: 12 Tabs. 17   Arun Sumner MD  
iron, carbonyl (FEOSOL) 45 mg tab Take 1 Tab by mouth daily. 17   Arun Sumner MD  
ULH27-Jzsy Cbn&Gluc-FA-DSS-DHA 35-1- mg cmpk Take  by mouth. Provider, Historical  
  
 
Review of Systems: A comprehensive review of systems was negative except for that written in the History of Present Illness. Objective:  
 
Vitals:   
Vitals:  
 19 1610 19 1615 19 1616 19 1621 BP:   (!) 143/103 Pulse:  (!) 104 Resp:      
Temp:      
SpO2: 99% 99%  (!) 3% Temp (24hrs), Av.5 °F (36.9 °C), Min:98.5 °F (36.9 °C), Max:98.5 °F (36.9 °C) BP  Min: 140/91  Max: 143/103 Physical Exam: 
Patient without distress. Heart: Regular rate and rhythm Lung: clear to auscultation throughout lung fields, no wheezes, no rales, no rhonchi and normal respiratory effort Abdomen: soft, nontender Lower Extremities: symmetric Membranes:  Intact Uterine Activity:  None Fetal Heart Rate:  Reactive Lab/Data Review: 
Recent Results (from the past 24 hour(s)) CBC W/O DIFF Collection Time: 19  4:01 PM  
Result Value Ref Range WBC 5.8 3.6 - 11.0 K/uL  
 RBC 2.96 (L) 3.80 - 5.20 M/uL HGB 7.3 (L) 11.5 - 16.0 g/dL HCT 23.9 (L) 35.0 - 47.0 % MCV 80.7 80.0 - 99.0 FL  
 MCH 24.7 (L) 26.0 - 34.0 PG  
 MCHC 30.5 30.0 - 36.5 g/dL  
 RDW 16.1 (H) 11.5 - 14.5 % PLATELET 013 (L) 043 - 400 K/uL MPV 11.9 8.9 - 12.9 FL  
 NRBC 0.0 0  WBC ABSOLUTE NRBC 0.00 0.00 - 0.01 K/uL Assessment and Plan: Active Problems: 
  Gestational hypertension (2019)  36 2/7 weeks with pre-eclampsia now with severe features with low platelets of 906 (762 2/) consistent with HELLP.   
-discussed that she now has HELLP and we would recommend proceeding with delivery due to risks of eclampsia and maternal and fetal morbidity.  
-given prior  x 2 she would like repeat - risks of bleeding, infection and damage to surrounding organs including bowel, bladder, ureters, nerves, vessels, uterus, ovaries, tubes and fetus reviewed. 
-anemia- will plan to type and hold 2 units of blood given hemoglobin of 7.3 and higher risk of bleeding with fibroid and prior c-sections 
-Ancef 2 gm IV pre-op 
-Cano and SCDs in OR 
-Anesthesia and OR team notified Signed By:  Justin Patel MD   
 2019

## 2019-02-07 NOTE — ANESTHESIA PROCEDURE NOTES
Spinal Block Performed by: Aparna Brandt MD 
Authorized by: Aparna Brandt MD  
 
Pre-procedure: Indications: primary anesthetic  Preanesthetic Checklist: patient identified, risks and benefits discussed, anesthesia consent, site marked, patient being monitored and timeout performed Spinal Block:  
Patient Position:  Seated Prep Region:  Lumbar Prep: DuraPrep Location:  L3-4 Technique:  Single shot Needle:  
Needle Type:  Pencil-tip Needle Gauge:  25 G Attempts:  3 Events: CSF confirmed, no blood with aspiration and no paresthesia Assessment: 
Insertion:  Uncomplicated Patient tolerance:  Patient tolerated the procedure well with no immediate complications 1.4 mL 0.75% Spinal Marcaine with dextrose + 0.5 mg Duramorph was deposited into the CSF.

## 2019-02-08 LAB
ALBUMIN SERPL-MCNC: 2 G/DL (ref 3.5–5)
ALBUMIN/GLOB SERPL: 0.6 {RATIO} (ref 1.1–2.2)
ALP SERPL-CCNC: 105 U/L (ref 45–117)
ALT SERPL-CCNC: 10 U/L (ref 12–78)
ANION GAP SERPL CALC-SCNC: 7 MMOL/L (ref 5–15)
AST SERPL-CCNC: 21 U/L (ref 15–37)
BASOPHILS # BLD: 0 K/UL (ref 0–0.1)
BASOPHILS NFR BLD: 0 % (ref 0–1)
BILIRUB SERPL-MCNC: 0.9 MG/DL (ref 0.2–1)
BUN SERPL-MCNC: 6 MG/DL (ref 6–20)
BUN/CREAT SERPL: 15 (ref 12–20)
CALCIUM SERPL-MCNC: 7 MG/DL (ref 8.5–10.1)
CHLORIDE SERPL-SCNC: 106 MMOL/L (ref 97–108)
CO2 SERPL-SCNC: 22 MMOL/L (ref 21–32)
CREAT SERPL-MCNC: 0.39 MG/DL (ref 0.55–1.02)
DIFFERENTIAL METHOD BLD: ABNORMAL
EOSINOPHIL # BLD: 0 K/UL (ref 0–0.4)
EOSINOPHIL NFR BLD: 0 % (ref 0–7)
ERYTHROCYTE [DISTWIDTH] IN BLOOD BY AUTOMATED COUNT: 15.9 % (ref 11.5–14.5)
ERYTHROCYTE [DISTWIDTH] IN BLOOD BY AUTOMATED COUNT: 16 % (ref 11.5–14.5)
GLOBULIN SER CALC-MCNC: 3.6 G/DL (ref 2–4)
GLUCOSE SERPL-MCNC: 89 MG/DL (ref 65–100)
HCT VFR BLD AUTO: 24.3 % (ref 35–47)
HCT VFR BLD AUTO: 24.4 % (ref 35–47)
HGB BLD-MCNC: 7.2 G/DL (ref 11.5–16)
HGB BLD-MCNC: 7.3 G/DL (ref 11.5–16)
IMM GRANULOCYTES # BLD AUTO: 0 K/UL (ref 0–0.04)
IMM GRANULOCYTES NFR BLD AUTO: 0 % (ref 0–0.5)
LYMPHOCYTES # BLD: 1 K/UL (ref 0.8–3.5)
LYMPHOCYTES NFR BLD: 16 % (ref 12–49)
MCH RBC QN AUTO: 24.2 PG (ref 26–34)
MCH RBC QN AUTO: 24.7 PG (ref 26–34)
MCHC RBC AUTO-ENTMCNC: 29.5 G/DL (ref 30–36.5)
MCHC RBC AUTO-ENTMCNC: 30 G/DL (ref 30–36.5)
MCV RBC AUTO: 81.9 FL (ref 80–99)
MCV RBC AUTO: 82.1 FL (ref 80–99)
MONOCYTES # BLD: 0.5 K/UL (ref 0–1)
MONOCYTES NFR BLD: 7 % (ref 5–13)
NEUTS SEG # BLD: 4.7 K/UL (ref 1.8–8)
NEUTS SEG NFR BLD: 76 % (ref 32–75)
NRBC # BLD: 0 K/UL (ref 0–0.01)
NRBC # BLD: 0 K/UL (ref 0–0.01)
NRBC BLD-RTO: 0 PER 100 WBC
NRBC BLD-RTO: 0 PER 100 WBC
PLATELET # BLD AUTO: 103 K/UL (ref 150–400)
PLATELET # BLD AUTO: 103 K/UL (ref 150–400)
PMV BLD AUTO: 12.1 FL (ref 8.9–12.9)
PMV BLD AUTO: 12.2 FL (ref 8.9–12.9)
POTASSIUM SERPL-SCNC: 3.7 MMOL/L (ref 3.5–5.1)
PROT SERPL-MCNC: 5.6 G/DL (ref 6.4–8.2)
RBC # BLD AUTO: 2.96 M/UL (ref 3.8–5.2)
RBC # BLD AUTO: 2.98 M/UL (ref 3.8–5.2)
SODIUM SERPL-SCNC: 135 MMOL/L (ref 136–145)
WBC # BLD AUTO: 5.5 K/UL (ref 3.6–11)
WBC # BLD AUTO: 6.2 K/UL (ref 3.6–11)

## 2019-02-08 PROCEDURE — 80053 COMPREHEN METABOLIC PANEL: CPT

## 2019-02-08 PROCEDURE — 85025 COMPLETE CBC W/AUTO DIFF WBC: CPT

## 2019-02-08 PROCEDURE — 74011250636 HC RX REV CODE- 250/636: Performed by: OBSTETRICS & GYNECOLOGY

## 2019-02-08 PROCEDURE — 74011250636 HC RX REV CODE- 250/636

## 2019-02-08 PROCEDURE — 65410000002 HC RM PRIVATE OB

## 2019-02-08 PROCEDURE — 85027 COMPLETE CBC AUTOMATED: CPT

## 2019-02-08 PROCEDURE — 74011250637 HC RX REV CODE- 250/637: Performed by: OBSTETRICS & GYNECOLOGY

## 2019-02-08 PROCEDURE — 36415 COLL VENOUS BLD VENIPUNCTURE: CPT

## 2019-02-08 RX ORDER — SODIUM CHLORIDE, SODIUM LACTATE, POTASSIUM CHLORIDE, CALCIUM CHLORIDE 600; 310; 30; 20 MG/100ML; MG/100ML; MG/100ML; MG/100ML
999 INJECTION, SOLUTION INTRAVENOUS CONTINUOUS
Status: DISCONTINUED | OUTPATIENT
Start: 2019-02-08 | End: 2019-02-08 | Stop reason: SDUPTHER

## 2019-02-08 RX ADMIN — SIMETHICONE CHEW TAB 80 MG 80 MG: 80 TABLET ORAL at 21:00

## 2019-02-08 RX ADMIN — MAGNESIUM SULFATE 2 G/HR: 500 INJECTION, SOLUTION INTRAMUSCULAR; INTRAVENOUS at 13:13

## 2019-02-08 RX ADMIN — IBUPROFEN 800 MG: 400 TABLET, FILM COATED ORAL at 05:44

## 2019-02-08 RX ADMIN — OXYCODONE AND ACETAMINOPHEN 1 TABLET: 5; 325 TABLET ORAL at 18:20

## 2019-02-08 RX ADMIN — SODIUM CHLORIDE, SODIUM LACTATE, POTASSIUM CHLORIDE, AND CALCIUM CHLORIDE 125 ML/HR: 600; 310; 30; 20 INJECTION, SOLUTION INTRAVENOUS at 06:37

## 2019-02-08 RX ADMIN — OXYCODONE AND ACETAMINOPHEN 1 TABLET: 5; 325 TABLET ORAL at 10:01

## 2019-02-08 RX ADMIN — IBUPROFEN 800 MG: 400 TABLET, FILM COATED ORAL at 14:05

## 2019-02-08 RX ADMIN — OXYCODONE AND ACETAMINOPHEN 1 TABLET: 5; 325 TABLET ORAL at 02:01

## 2019-02-08 RX ADMIN — MAGNESIUM SULFATE 2 G/HR: 500 INJECTION, SOLUTION INTRAMUSCULAR; INTRAVENOUS at 07:16

## 2019-02-08 RX ADMIN — SODIUM CHLORIDE, SODIUM LACTATE, POTASSIUM CHLORIDE, AND CALCIUM CHLORIDE 75 ML/HR: 600; 310; 30; 20 INJECTION, SOLUTION INTRAVENOUS at 07:26

## 2019-02-08 RX ADMIN — MAGNESIUM SULFATE 2 G/HR: 500 INJECTION, SOLUTION INTRAMUSCULAR; INTRAVENOUS at 01:16

## 2019-02-08 RX ADMIN — SODIUM CHLORIDE, SODIUM LACTATE, POTASSIUM CHLORIDE, AND CALCIUM CHLORIDE 999 ML/HR: 600; 310; 30; 20 INJECTION, SOLUTION INTRAVENOUS at 05:30

## 2019-02-08 RX ADMIN — IBUPROFEN 800 MG: 400 TABLET, FILM COATED ORAL at 22:17

## 2019-02-08 NOTE — PROGRESS NOTES
Duramorph Follow-Up Note 1 Day Post-Op sp Procedure(s):  SECTION. /75   Pulse 71   Temp 36.9 °C (98.5 °F)   Resp 16   Ht 5' 5\" (1.651 m)   Wt 79.4 kg (175 lb)   SpO2 98%   Breastfeeding? Unknown   BMI 29.12 kg/m² . Patient is POD-1 S/P intrathecal duramorph. Pain is moderately controlled Patient reports no headache, fever, weakness or numbness. Epidural/spinal tap site is clean, dry and intact. No obvious Anesthesia complications noted. Plan: 
 
Pain management as per primary service.

## 2019-02-08 NOTE — PROGRESS NOTES
Post-Operative  Day 1 Mariana Melendez Assessment: Post-Op day 1, stable Plan:   1. Routine post-operative care 2. Will continue magnesium sulfate until 24 hours post-delivery Will recheck hemoglobin in AM, will hold on transfusion for now as stable Will monitor blood pressure Information for the patient's :  Elliot Marsh [[id number]] , Low Transverse Patient doing well without significant complaint. Nausea and vomiting resolved, tolerating liquids, no flatus, womack in place. Vitals: 
Visit Vitals /69 Pulse 85 Temp 98.5 °F (36.9 °C) Resp 16 Ht 5' 5\" (1.651 m) Wt 79.4 kg (175 lb) SpO2 96% Breastfeeding? Unknown BMI 29.12 kg/m² Temp (24hrs), Av.2 °F (36.8 °C), Min:97.6 °F (36.4 °C), Max:98.5 °F (36.9 °C) Last 24hr Input/Output: 
 
Intake/Output Summary (Last 24 hours) at 2019 1649 Last data filed at 2019 1409 Gross per 24 hour Intake 4338.76 ml Output 2505 ml Net 1833.76 ml Exam:   
    Patient without distress. Lungs clear. Abdomen, bowel sounds present, soft, expected tenderness, fundus firm Wound dressing intact Perineum normal lochia noted Lower extremities are negative for swelling, cords or tenderness. Labs:  
Lab Results Component Value Date/Time  WBC 5.5 2019 11:53 AM  
 WBC 6.2 2019 04:09 AM  
 WBC 5.8 2019 04:01 PM  
 WBC 7.4 2019 11:27 PM  
 WBC 6.8 2017 04:55 AM  
 WBC 8.4 2017 08:41 AM  
 WBC 7.8 2017 03:50 AM  
 WBC 7.1 07/10/2017 11:38 AM  
 WBC 7.1 2013 02:43 PM  
 HGB 7.3 (L) 2019 11:53 AM  
 HGB 7.2 (L) 2019 04:09 AM  
 HGB 7.3 (L) 2019 04:01 PM  
 HGB 8.0 (L) 2019 11:27 PM  
 HGB 7.7 (L) 2017 04:55 AM  
 HGB 9.1 (L) 2017 08:41 AM  
 HGB 8.2 (L) 2017 03:50 AM  
 HGB 8.8 (L) 07/10/2017 11:38 AM  
 HGB 11.4 (L) 2013 02:43 PM  
 HCT 24.3 (L) 2019 11:53 AM  
 HCT 24.4 (L) 02/08/2019 04:09 AM  
 HCT 23.9 (L) 02/07/2019 04:01 PM  
 HCT 26.4 (L) 02/01/2019 11:27 PM  
 HCT 24.0 (L) 07/13/2017 04:55 AM  
 HCT 28.6 (L) 07/12/2017 08:41 AM  
 HCT 25.2 (L) 07/11/2017 03:50 AM  
 HCT 26.9 (L) 07/10/2017 11:38 AM  
 HCT 34.1 (L) 08/01/2013 02:43 PM  
 PLATELET 353 (L) 99/61/7490 11:53 AM  
 PLATELET 907 (L) 10/91/2816 04:09 AM  
 PLATELET 626 (L) 51/19/8620 04:01 PM  
 PLATELET 580 45/94/5184 11:27 PM  
 PLATELET 908 (L) 46/32/6676 04:55 AM  
 PLATELET 723 (L) 03/08/8391 08:41 AM  
 PLATELET 556 (L) 00/84/7645 03:50 AM  
 PLATELET 536 (L) 68/92/8816 11:38 AM  
 PLATELET 184 17/30/3266 02:43 PM  
 
 
Recent Results (from the past 24 hour(s)) TYPE + CROSSMATCH Collection Time: 02/07/19  5:03 PM  
Result Value Ref Range Crossmatch Expiration 02/10/2019 ABO/Rh(D) A POSITIVE Antibody screen NEG Unit number [id number] Blood component type [facility] Unit division 00 Status of unit ALLOCATED Crossmatch result Compatible Unit number [id number] Blood component type [facility] Unit division 00 Status of unit ALLOCATED Crossmatch result Compatible CBC WITH AUTOMATED DIFF Collection Time: 02/08/19  4:09 AM  
Result Value Ref Range WBC 6.2 3.6 - 11.0 K/uL  
 RBC 2.98 (L) 3.80 - 5.20 M/uL HGB 7.2 (L) 11.5 - 16.0 g/dL HCT 24.4 (L) 35.0 - 47.0 % MCV 81.9 80.0 - 99.0 FL  
 MCH 24.2 (L) 26.0 - 34.0 PG  
 MCHC 29.5 (L) 30.0 - 36.5 g/dL  
 RDW 15.9 (H) 11.5 - 14.5 % PLATELET 599 (L) 542 - 400 K/uL MPV 12.1 8.9 - 12.9 FL  
 NRBC 0.0 0  WBC ABSOLUTE NRBC 0.00 0.00 - 0.01 K/uL NEUTROPHILS 76 (H) 32 - 75 % LYMPHOCYTES 16 12 - 49 % MONOCYTES 7 5 - 13 % EOSINOPHILS 0 0 - 7 % BASOPHILS 0 0 - 1 % IMMATURE GRANULOCYTES 0 0.0 - 0.5 % ABS. NEUTROPHILS 4.7 1.8 - 8.0 K/UL  
 ABS. LYMPHOCYTES 1.0 0.8 - 3.5 K/UL  
 ABS. MONOCYTES 0.5 0.0 - 1.0 K/UL  
 ABS. EOSINOPHILS 0.0 0.0 - 0.4 K/UL ABS. BASOPHILS 0.0 0.0 - 0.1 K/UL  
 ABS. IMM. GRANS. 0.0 0.00 - 0.04 K/UL  
 DF AUTOMATED METABOLIC PANEL, COMPREHENSIVE Collection Time: 02/08/19  4:09 AM  
Result Value Ref Range Sodium 135 (L) 136 - 145 mmol/L Potassium 3.7 3.5 - 5.1 mmol/L Chloride 106 97 - 108 mmol/L  
 CO2 22 21 - 32 mmol/L Anion gap 7 5 - 15 mmol/L Glucose 89 65 - 100 mg/dL BUN 6 6 - 20 MG/DL Creatinine 0.39 (L) 0.55 - 1.02 MG/DL  
 BUN/Creatinine ratio 15 12 - 20 GFR est AA >60 >60 ml/min/1.73m2 GFR est non-AA >60 >60 ml/min/1.73m2 Calcium 7.0 (L) 8.5 - 10.1 MG/DL Bilirubin, total 0.9 0.2 - 1.0 MG/DL  
 ALT (SGPT) 10 (L) 12 - 78 U/L  
 AST (SGOT) 21 15 - 37 U/L Alk. phosphatase 105 45 - 117 U/L Protein, total 5.6 (L) 6.4 - 8.2 g/dL Albumin 2.0 (L) 3.5 - 5.0 g/dL Globulin 3.6 2.0 - 4.0 g/dL A-G Ratio 0.6 (L) 1.1 - 2.2    
CBC W/O DIFF Collection Time: 02/08/19 11:53 AM  
Result Value Ref Range WBC 5.5 3.6 - 11.0 K/uL  
 RBC 2.96 (L) 3.80 - 5.20 M/uL HGB 7.3 (L) 11.5 - 16.0 g/dL HCT 24.3 (L) 35.0 - 47.0 % MCV 82.1 80.0 - 99.0 FL  
 MCH 24.7 (L) 26.0 - 34.0 PG  
 MCHC 30.0 30.0 - 36.5 g/dL  
 RDW 16.0 (H) 11.5 - 14.5 % PLATELET 788 (L) 072 - 400 K/uL MPV 12.2 8.9 - 12.9 FL  
 NRBC 0.0 0  WBC ABSOLUTE NRBC 0.00 0.00 - 0.01 K/uL

## 2019-02-08 NOTE — PROGRESS NOTES
Report has been gotten. Pt denies any needs at this time. Pt expresses her concern about getting pain med when needed- she would like another dose of Toradol at 0200- we talked about evaluating motrin verse toradol- pt expresses that after her last delivery she got real uncomfortable fast. IVF bolus started by Angus Chung RN due to output decreasing. 0038 assisted in turning to side- pericare completed 
 
0203 since pain level is a 0/10 we both decided to get percocet. 0210 O2 via NC started for continued moments when sleeping the sats range near 89% 
 
0309 O2 Sats consistent at 99 to 100% 0330 after sitting up- pt vomited- assisted to left side. At [address] morales regarding O2 application and urine output- will do a liter LR bolus. Ordered labs drawn at [address] on sheets- changed and pericare completed- . No change in output amount yet from IVF bolus.  
 
2541 bedside report given to Cosmo Sharif RN

## 2019-02-08 NOTE — LACTATION NOTE
Lactation Consultant Visits Breast-Feedings: Not breast-feeding On formula ad talon, Late pre term glucose Results for Ar Macdonald (MRN [id number]) as of 2/8/2019 13:02 Ref. Range 2/7/2019 20:41 2/7/2019 22:33 2/8/2019 03:20 2/8/2019 05:13 2/8/2019 07:51 2/8/2019 08:00 2/8/2019 09:18 2/8/2019 09:45 2/8/2019 11:08 GLUCOSE,FAST - POC Latest Ref Range: 50 - 110 mg/dL 46 (LL) 49 (LL) 55 46 (LL) 31 (LL) 42 (LL) 27 (LL)  52 Glucose Latest Ref Range: 47 - 110 mg/dL        34 (LL)   
 
 
LC introduced to mother/post op 20 hours with 39 2/7 gestation baby girl. Mother stated she has not yet decided to breast feed after delivery and has only formula fed her infant. Offered LC time and offered handout/booklet on breastfeeding during hospital stay. [address] [facility] # provided. Call prn.

## 2019-02-09 LAB
ERYTHROCYTE [DISTWIDTH] IN BLOOD BY AUTOMATED COUNT: 16.3 % (ref 11.5–14.5)
HCT VFR BLD AUTO: 21.2 % (ref 35–47)
HGB BLD-MCNC: 6.4 G/DL (ref 11.5–16)
MCH RBC QN AUTO: 25 PG (ref 26–34)
MCHC RBC AUTO-ENTMCNC: 30.2 G/DL (ref 30–36.5)
MCV RBC AUTO: 82.8 FL (ref 80–99)
NRBC # BLD: 0 K/UL (ref 0–0.01)
NRBC BLD-RTO: 0 PER 100 WBC
PLATELET # BLD AUTO: 113 K/UL (ref 150–400)
PMV BLD AUTO: 12.2 FL (ref 8.9–12.9)
RBC # BLD AUTO: 2.56 M/UL (ref 3.8–5.2)
WBC # BLD AUTO: 5.7 K/UL (ref 3.6–11)

## 2019-02-09 PROCEDURE — 77030040133 HC BLANKET THRM DISP CSZ -A

## 2019-02-09 PROCEDURE — 74011250637 HC RX REV CODE- 250/637: Performed by: OBSTETRICS & GYNECOLOGY

## 2019-02-09 PROCEDURE — 85027 COMPLETE CBC AUTOMATED: CPT

## 2019-02-09 PROCEDURE — 65410000002 HC RM PRIVATE OB

## 2019-02-09 PROCEDURE — 36415 COLL VENOUS BLD VENIPUNCTURE: CPT

## 2019-02-09 PROCEDURE — 77030018846 HC SOL IRR STRL H20 ICUM -A

## 2019-02-09 RX ADMIN — OXYCODONE AND ACETAMINOPHEN 1 TABLET: 5; 325 TABLET ORAL at 05:49

## 2019-02-09 RX ADMIN — OXYCODONE AND ACETAMINOPHEN 1 TABLET: 5; 325 TABLET ORAL at 10:11

## 2019-02-09 RX ADMIN — IBUPROFEN 800 MG: 400 TABLET, FILM COATED ORAL at 05:49

## 2019-02-09 RX ADMIN — OXYCODONE AND ACETAMINOPHEN 1 TABLET: 5; 325 TABLET ORAL at 01:03

## 2019-02-09 RX ADMIN — OXYCODONE AND ACETAMINOPHEN 1 TABLET: 5; 325 TABLET ORAL at 22:29

## 2019-02-09 RX ADMIN — IBUPROFEN 800 MG: 400 TABLET, FILM COATED ORAL at 13:02

## 2019-02-09 RX ADMIN — SIMETHICONE CHEW TAB 80 MG 80 MG: 80 TABLET ORAL at 04:26

## 2019-02-09 RX ADMIN — IBUPROFEN 800 MG: 400 TABLET, FILM COATED ORAL at 20:50

## 2019-02-09 RX ADMIN — OXYCODONE AND ACETAMINOPHEN 1 TABLET: 5; 325 TABLET ORAL at 14:13

## 2019-02-09 RX ADMIN — OXYCODONE AND ACETAMINOPHEN 1 TABLET: 5; 325 TABLET ORAL at 18:29

## 2019-02-09 RX ADMIN — SIMETHICONE CHEW TAB 80 MG 80 MG: 80 TABLET ORAL at 14:16

## 2019-02-09 NOTE — PROGRESS NOTES
Post-Operative  Day 2 Nallely Mayorga Assessment: Post-Op day 2, doing well Plan: 1. Routine post-operative care 2. Hgb 6 but asymptomatic (started at 7). Bleeding precautions reviewed. 3.  Anticipate dc tomorrow. Information for the patient's :  Daisy Hodge [[id number]] , Low Transverse Patient doing well without significant complaint. Nausea and vomiting resolved, tolerating liquids, passing flatus, voiding and ambulating without difficulty. Vitals: 
Visit Vitals /69 (BP 1 Location: Right arm, BP Patient Position: At rest) Pulse 63 Temp 97.1 °F (36.2 °C) Resp 18 Ht 5' 5\" (1.651 m) Wt 79.4 kg (175 lb) SpO2 97% Breastfeeding? Unknown BMI 29.12 kg/m² Temp (24hrs), Av °F (36.7 °C), Min:97.1 °F (36.2 °C), Max:98.4 °F (36.9 °C) Exam:   
    Patient without distress. Abdomen, bowel sounds present, soft, expected tenderness, fundus firm Wound incision clean, dry and intact Lower extremities are negative for swelling, cords or tenderness. Labs:  
Lab Results Component Value Date/Time  WBC 5.7 2019 03:30 AM  
 WBC 5.5 2019 11:53 AM  
 WBC 6.2 2019 04:09 AM  
 WBC 5.8 2019 04:01 PM  
 WBC 7.4 2019 11:27 PM  
 WBC 6.8 2017 04:55 AM  
 WBC 8.4 2017 08:41 AM  
 WBC 7.8 2017 03:50 AM  
 WBC 7.1 07/10/2017 11:38 AM  
 WBC 7.1 2013 02:43 PM  
 HGB 6.4 (L) 2019 03:30 AM  
 HGB 7.3 (L) 2019 11:53 AM  
 HGB 7.2 (L) 2019 04:09 AM  
 HGB 7.3 (L) 2019 04:01 PM  
 HGB 8.0 (L) 2019 11:27 PM  
 HGB 7.7 (L) 2017 04:55 AM  
 HGB 9.1 (L) 2017 08:41 AM  
 HGB 8.2 (L) 2017 03:50 AM  
 HGB 8.8 (L) 07/10/2017 11:38 AM  
 HGB 11.4 (L) 2013 02:43 PM  
 HCT 21.2 (L) 2019 03:30 AM  
 HCT 24.3 (L) 2019 11:53 AM  
 HCT 24.4 (L) 2019 04:09 AM  
 HCT 23.9 (L) 2019 04:01 PM  
 HCT 26.4 (L) 02/01/2019 11:27 PM  
 HCT 24.0 (L) 07/13/2017 04:55 AM  
 HCT 28.6 (L) 07/12/2017 08:41 AM  
 HCT 25.2 (L) 07/11/2017 03:50 AM  
 HCT 26.9 (L) 07/10/2017 11:38 AM  
 HCT 34.1 (L) 08/01/2013 02:43 PM  
 PLATELET 085 (L) 89/06/4995 03:30 AM  
 PLATELET 347 (L) 06/79/2647 11:53 AM  
 PLATELET 962 (L) 95/11/4433 04:09 AM  
 PLATELET 387 (L) 06/33/8091 04:01 PM  
 PLATELET 074 29/19/7519 11:27 PM  
 PLATELET 678 (L) 70/04/5307 04:55 AM  
 PLATELET 034 (L) 14/32/6734 08:41 AM  
 PLATELET 329 (L) 74/32/5407 03:50 AM  
 PLATELET 893 (L) 27/27/5827 11:38 AM  
 PLATELET 210 49/63/4835 02:43 PM  
 
 
Recent Results (from the past 24 hour(s)) CBC W/O DIFF Collection Time: 02/08/19 11:53 AM  
Result Value Ref Range WBC 5.5 3.6 - 11.0 K/uL  
 RBC 2.96 (L) 3.80 - 5.20 M/uL HGB 7.3 (L) 11.5 - 16.0 g/dL HCT 24.3 (L) 35.0 - 47.0 % MCV 82.1 80.0 - 99.0 FL  
 MCH 24.7 (L) 26.0 - 34.0 PG  
 MCHC 30.0 30.0 - 36.5 g/dL  
 RDW 16.0 (H) 11.5 - 14.5 % PLATELET 212 (L) 283 - 400 K/uL MPV 12.2 8.9 - 12.9 FL  
 NRBC 0.0 0  WBC ABSOLUTE NRBC 0.00 0.00 - 0.01 K/uL CBC W/O DIFF Collection Time: 02/09/19  3:30 AM  
Result Value Ref Range WBC 5.7 3.6 - 11.0 K/uL  
 RBC 2.56 (L) 3.80 - 5.20 M/uL HGB 6.4 (L) 11.5 - 16.0 g/dL HCT 21.2 (L) 35.0 - 47.0 % MCV 82.8 80.0 - 99.0 FL  
 MCH 25.0 (L) 26.0 - 34.0 PG  
 MCHC 30.2 30.0 - 36.5 g/dL  
 RDW 16.3 (H) 11.5 - 14.5 % PLATELET 275 (L) 130 - 400 K/uL MPV 12.2 8.9 - 12.9 FL  
 NRBC 0.0 0  WBC ABSOLUTE NRBC 0.00 0.00 - 0.01 K/uL

## 2019-02-09 NOTE — ROUTINE PROCESS
Bedside shift change report given to JONNATHAN Díaz RN (oncoming nurse) by LIZBETH Lin RN (offgoing nurse). Report included the following information SBAR, Procedure Summary, Intake/Output, MAR and Recent Results.

## 2019-02-09 NOTE — PROGRESS NOTES
Pt c/o sharp pain at epidural site that went down her leg. Noted bruising at epidural site. Discussed possible swelling and bruising causing discomfort. Offered ice to alleviate pain. Pt states it was not continuous, just brief 20 seconds of pain. Pt educated to let RN know if it occurs again or if numbness or tingling occurs in legs/feet with pain to let anesthesiologist be aware 1830 pt c/o epidural spot pain radiating pain down her leg again, attempted to call Dr Herbie Casanova to report it. No answer, will try again. [id number] no answer. Spoke to Dr Milli Peña about it, he recomends that Dr Herbie Casanova see patient to assess

## 2019-02-09 NOTE — ROUTINE PROCESS
Bedside and Verbal shift change report given to LIZBETH Hurst RN (oncoming nurse) by KARLA Lao RNC-MNN (offgoing nurse). Report included the following information SBAR, Procedure Summary, Intake/Output, MAR and Recent Results.

## 2019-02-10 VITALS
SYSTOLIC BLOOD PRESSURE: 159 MMHG | BODY MASS INDEX: 29.16 KG/M2 | DIASTOLIC BLOOD PRESSURE: 77 MMHG | HEART RATE: 80 BPM | OXYGEN SATURATION: 97 % | WEIGHT: 175 LBS | RESPIRATION RATE: 17 BRPM | TEMPERATURE: 97.9 F | HEIGHT: 65 IN

## 2019-02-10 PROCEDURE — 74011250637 HC RX REV CODE- 250/637: Performed by: OBSTETRICS & GYNECOLOGY

## 2019-02-10 RX ORDER — IBUPROFEN 800 MG/1
800 TABLET ORAL EVERY 8 HOURS
Qty: 36 TAB | Refills: 0 | Status: SHIPPED | OUTPATIENT
Start: 2019-02-10

## 2019-02-10 RX ORDER — OXYCODONE AND ACETAMINOPHEN 5; 325 MG/1; MG/1
1 TABLET ORAL
Qty: 28 TAB | Refills: 0 | Status: SHIPPED | OUTPATIENT
Start: 2019-02-10

## 2019-02-10 RX ADMIN — IBUPROFEN 800 MG: 400 TABLET, FILM COATED ORAL at 06:50

## 2019-02-10 RX ADMIN — OXYCODONE AND ACETAMINOPHEN 1 TABLET: 5; 325 TABLET ORAL at 02:44

## 2019-02-10 RX ADMIN — OXYCODONE AND ACETAMINOPHEN 1 TABLET: 5; 325 TABLET ORAL at 09:46

## 2019-02-10 NOTE — DISCHARGE SUMMARY
Obstetrical Discharge Summary     Name: Sammy Berry MRN: [id number]  SSN: [id number]    YOB: 1980  Age: 41 y.o. Sex: female      Admit Date: 2019    Discharge Date: 2/10/2019     Admitting Physician: Bernabe Gabriel MD     Attending Physician:  Ermalene Merlin, MD     Admission Diagnoses: Gestational hypertension [O13.9]    Discharge Diagnoses:   Information for the patient's :  Ailyn Schwartz [[id number]]   Delivery of a 3.135 kg female infant via , Low Transverse on 2019 at 6:19 PM  by . Apgars were 9 and 9. Additional Diagnoses:   Hospital Problems  Date Reviewed: 7/10/2017          Codes Class Noted POA    Gestational hypertension ICD-10-CM: O13.9  ICD-9-CM: 642.30  2019 Unknown             Lab Results   Component Value Date/Time    Rubella, External immune 3.19 2018    GrBStrep, External negative 2019       Hospital Course: Normal hospital course following the delivery. Disposition at Discharge: Home or self care    Discharged Condition: Stable    Patient Instructions:   Current Discharge Medication List      START taking these medications    Details   ibuprofen (MOTRIN) 800 mg tablet Take 1 Tab by mouth every eight (8) hours. Qty: 36 Tab, Refills: 0         CONTINUE these medications which have CHANGED    Details   oxyCODONE-acetaminophen (PERCOCET) 5-325 mg per tablet Take 1 Tab by mouth every six (6) hours as needed for Pain. Max Daily Amount: 4 Tabs. Qty: 28 Tab, Refills: 0    Associated Diagnoses: Gestational hypertension, antepartum         CONTINUE these medications which have NOT CHANGED    Details   iron, carbonyl (FEOSOL) 45 mg tab Take 1 Tab by mouth daily. Qty: 30 Tab, Refills: 2      PNV38-Iron Cbn&Gluc-FA-DSS-DHA 35-1- mg cmpk Take  by mouth. Reference my discharge instructions.     Follow-up Appointments   Procedures    FOLLOW UP VISIT Appointment in: One Week     Standing Status:   Standing Number of Occurrences:   1     Order Specific Question:   Appointment in     Answer:    One Week        Signed By:  Damian Cano MD     February 10, 2019

## 2019-02-10 NOTE — PROGRESS NOTES
Post-Operative  Day 3 Elizabeth Splinter Assessment: Post-Op day 3, doing well Plan: 1. Discharge home today 2. Follow up in office in 1 weeks with Nazario Mahmood MD for bp check. 3. Post partum activity/wound care advised, diet as tolerated 4. Discharge Medications: ibuprofen, percocet and medications prior to admission Information for the patient's :  Sary Dempsey [[id number]] , Low Transverse Patient doing well without significant complaint. Tolerating diet, passing flatus, voiding and ambulating without difficulty Vitals: 
Visit Vitals /77 (BP 1 Location: Right arm, BP Patient Position: Pre-activity) Pulse 80 Temp 97.9 °F (36.6 °C) Resp 17 Ht 5' 5\" (1.651 m) Wt 79.4 kg (175 lb) SpO2 97% Breastfeeding? Unknown BMI 29.12 kg/m² Temp (24hrs), Av.1 °F (36.7 °C), Min:97.5 °F (36.4 °C), Max:98.7 °F (37.1 °C) Exam:   
    Patient without distress. Abdomen, bowel sounds present, soft, expected tenderness, fundus firm Wound incision clean, dry and intact Lower extremities are negative for swelling, cords or tenderness. Labs:  
Lab Results Component Value Date/Time  WBC 5.7 2019 03:30 AM  
 WBC 5.5 2019 11:53 AM  
 WBC 6.2 2019 04:09 AM  
 WBC 5.8 2019 04:01 PM  
 WBC 7.4 2019 11:27 PM  
 WBC 6.8 2017 04:55 AM  
 WBC 8.4 2017 08:41 AM  
 WBC 7.8 2017 03:50 AM  
 WBC 7.1 07/10/2017 11:38 AM  
 WBC 7.1 2013 02:43 PM  
 HGB 6.4 (L) 2019 03:30 AM  
 HGB 7.3 (L) 2019 11:53 AM  
 HGB 7.2 (L) 2019 04:09 AM  
 HGB 7.3 (L) 2019 04:01 PM  
 HGB 8.0 (L) 2019 11:27 PM  
 HGB 7.7 (L) 2017 04:55 AM  
 HGB 9.1 (L) 2017 08:41 AM  
 HGB 8.2 (L) 2017 03:50 AM  
 HGB 8.8 (L) 07/10/2017 11:38 AM  
 HGB 11.4 (L) 2013 02:43 PM  
 HCT 21.2 (L) 2019 03:30 AM  
 HCT 24.3 (L) 02/08/2019 11:53 AM  
 HCT 24.4 (L) 02/08/2019 04:09 AM  
 HCT 23.9 (L) 02/07/2019 04:01 PM  
 HCT 26.4 (L) 02/01/2019 11:27 PM  
 HCT 24.0 (L) 07/13/2017 04:55 AM  
 HCT 28.6 (L) 07/12/2017 08:41 AM  
 HCT 25.2 (L) 07/11/2017 03:50 AM  
 HCT 26.9 (L) 07/10/2017 11:38 AM  
 HCT 34.1 (L) 08/01/2013 02:43 PM  
 PLATELET 168 (L) 89/38/8058 03:30 AM  
 PLATELET 853 (L) 59/68/8314 11:53 AM  
 PLATELET 044 (L) 95/68/1834 04:09 AM  
 PLATELET 171 (L) 85/18/2556 04:01 PM  
 PLATELET 694 26/36/3802 11:27 PM  
 PLATELET 836 (L) 66/68/3418 04:55 AM  
 PLATELET 217 (L) 77/34/5080 08:41 AM  
 PLATELET 783 (L) 01/17/5803 03:50 AM  
 PLATELET 760 (L) 59/89/9106 11:38 AM  
 PLATELET 768 74/83/4011 02:43 PM  
 
 
No results found for this or any previous visit (from the past 24 hour(s)).

## 2019-02-10 NOTE — PROGRESS NOTES
Dr. Dee Dee Archer notified of 2 episodes (each 30 minutes apart) of sharp, shooting pains beginning at epidural site and shooting down both legs lasting for approximately 30-45 seconds. Stated was sitting up and was not moving each time. Denies having experienced this in her past. Percocet was given at the second episode. She has not had any repeat episodes since. Will place heat pack and continue with medications that contain acetaminophen as per Dr. Maurice Brooks suggestion. 2045: K-Pad placed with setting @ 107 F. Explained communication with Dr. Dee Dee Archer. 2230: Patient removed k-pad stating that \"It did not heat up but made me sweat. \" States she has not had any repeat episodes of shooting pain.

## 2019-02-10 NOTE — LACTATION NOTE
Couplet Interdisciplinary Rounds MATERNAL Daily Goal:  
 
Influenza screening completed: Patient refused  Tdap screening completed: Patient refused Rhogam Given:N/A 
MMR Given:N/A 
 
VTE Prophylaxis: Not indicated, per Provider order EPDS:   
 
 Patient Name: Nallely Mayorga Diagnosis: Gestational hypertension [O13.9] Date of Admission: 2019 LOS: 3 Gestational Age: Gestational Age: <None>  
 
 
Daily Goal:  
 
Birth Weight: No birth weight on file. Current Weight: Weight: 79.4 kg (175 lb) 
% of Weight Change: Birth weight not on file Feeding: 
[place] Metabolic Screen: YES Hepatitis B:  YES Discharge Bili:  YES Car Seat Trial, if needed:  YES Patient/Family Teaching Needs:  
 
Days before discharge: Ready for discharge In Attendance:  Nursing and Physician

## 2019-02-10 NOTE — PROGRESS NOTES
Epidural Follow-up Note 2 Days Post-Op sp Procedure(s):  SECTION. Visit Vitals /69 (BP Patient Position: At rest) Pulse 78 Temp 36.4 °C (97.5 °F) Resp 16 Ht 5' 5\" (1.651 m) Wt 79.4 kg (175 lb) SpO2 97% Breastfeeding? Unknown BMI 29.12 kg/m² Rohith Downs Called to bedside secondary to c/o \"shooting pains\" stemming from lower back to BL LE x 2 instances. Patient resting in bed during each occurrence. Duration of approx 20 secs. No loss of motor function. Bowel/bladder function intact. No fever. Epidural site is clean, dry and intact. A/P:  Pain likely secondary to back spasm, however potentially also due to nerve root irritation. I've recommended standard PO analgesics prn as well as prn heat or ice. She will notify her OB if the problem persists at which point a Neurology consult is advised.

## 2019-02-10 NOTE — ROUTINE PROCESS
Bedside and Verbal shift change report given to LIZBETH GalarzaRN (oncoming nurse) by KARLA Lao RNC-MNN (offgoing nurse). Report included the following information SBAR, Procedure Summary, Intake/Output, MAR and Recent Results.

## 2019-02-10 NOTE — DISCHARGE INSTRUCTIONS
POST DELIVERY DISCHARGE INSTRUCTIONS    Name: Kobe Nguyễn  YOB: 1980  Primary Diagnosis: Active Problems:    Gestational hypertension (2/7/2019)        General:     Diet/Diet Restrictions:  · Eight 8-ounce glasses of fluid daily (water, juices); avoid excessive caffeine intake. · Meals/snacks as desired which are high in fiber and carbohydrates and low in fat and cholesterol. Medications:   {Medication reconciliation information is now added to the patient's AVS automatically when it is printed. There is no need to use this SmartLink in discharge instructions. Highlight this text and delete it to clear this message}      Physical Activity / Restrictions / Safety:     · Avoid heavy lifting, no more that 8 lbs. For 2-3 weeks;   · Limit use of stairs to 2 times daily for the first week home. · No driving for one week. · Avoid intercourse 4-6 weeks, no douching or tampon use. · Check with obstetrician before starting or resuming an exercise program.      Discharge Instructions/Special Treatment/Home Care Needs:     · Continue prenatal vitamins. · Continue to use squirt bottle with warm water on your episiotomy after each bathroom use until bleeding stops. · If steri-strips applied to your incision, remove in 7-10 days. Call your doctor for the following:     · Fever over 101 degrees by mouth. · Vaginal bleeding heavier than a normal menstrual period or clots larger than a golf ball. · Red streaks or increased swelling of legs, painful red streaks on your breast.  · Painful urination, constipation and increased pain or swelling or discharge with your incision. · If you feel extremely anxious or overwhelmed. · If you have thoughts of harming yourself and/or your baby. Pain Management:     · Take Acetaminophen (Tylenol) or Ibuprofen (Advil, Motrin), as directed for pain. · Use a warm Sitz bath 3 times daily to relieve episiotomy or hemorrhoidal discomfort.    · For hemorrhoidal discomfort, use Tucks and Anusol cream as needed and directed. · Heating pad to  incision as needed. Follow-Up Care:     Appointment with MD:   Follow-up Appointments   Procedures    FOLLOW UP VISIT Appointment in: One Week     Standing Status:   Standing     Number of Occurrences:   1     Order Specific Question:   Appointment in     Answer:    One Week     Telephone number: [phone]    Signed By: Tiffany Rice MD                                                                                                   Date: 2/10/2019 Time: 10:41 AM

## 2019-02-11 LAB
ABO + RH BLD: NORMAL
BLD PROD TYP BPU: NORMAL
BLD PROD TYP BPU: NORMAL
BLOOD GROUP ANTIBODIES SERPL: NORMAL
BPU ID: NORMAL
BPU ID: NORMAL
CROSSMATCH RESULT,%XM: NORMAL
CROSSMATCH RESULT,%XM: NORMAL
SPECIMEN EXP DATE BLD: NORMAL
STATUS OF UNIT,%ST: NORMAL
STATUS OF UNIT,%ST: NORMAL
UNIT DIVISION, %UDIV: 0
UNIT DIVISION, %UDIV: 0

## 2020-09-17 NOTE — ANESTHESIA POSTPROCEDURE EVALUATION
Procedure(s):  SECTION. Anesthesia Post Evaluation Patient location during evaluation: PACU Note status: Adequate. Level of consciousness: responsive to verbal stimuli and sleepy but conscious Pain management: satisfactory to patient Airway patency: patent Anesthetic complications: no 
Cardiovascular status: acceptable Respiratory status: acceptable Hydration status: acceptable Comments: +Post-Anesthesia Evaluation and Assessment Patient: Mariana Melendez MRN: [id number]  SSN: [id number] YOB: 1980  Age: 41 y.o. Sex: female Cardiovascular Function/Vital Signs /60   Pulse 82   Temp 36.9 °C (98.5 °F)   Resp 18   Ht 5' 5\" (1.651 m)   Wt 79.4 kg (175 lb)   SpO2 99%   Breastfeeding? No   BMI 29.12 kg/m² Patient is status post Procedure(s):  SECTION. Nausea/Vomiting: Controlled. Postoperative hydration reviewed and adequate. Pain: 
Pain Scale 1: Numeric (0 - 10) (19) Pain Intensity 1: 0 (19) Managed. Neurological Status: At baseline. Mental Status and Level of Consciousness: Arousable. Pulmonary Status:  
O2 Device: Room air (19 1849) Adequate oxygenation and airway patent. Complications related to anesthesia: None Post-anesthesia assessment completed. No concerns. Signed By: Poornima Benjamin MD  
 2019 Post anesthesia nausea and vomiting:  controlled Visit Vitals /60 Pulse 82 Temp 36.9 °C (98.5 °F) Resp 18 Ht 5' 5\" (1.651 m) Wt 79.4 kg (175 lb) SpO2 99% Breastfeeding? No  
BMI 29.12 kg/m² persistent diarrhea/persistent lack of appetite/other (specify)/pain/persistent nausea/vomiting/weakness, alcohol dependence w/withdrawal, depression cirrhosis, liver mass, Ascites, transaminitis

## (undated) DEVICE — SOLIDIFIER MEDC 1200ML -- CONVERT TO 356117

## (undated) DEVICE — MEDI-VAC NON-CONDUCTIVE SUCTION TUBING: Brand: CARDINAL HEALTH

## (undated) DEVICE — STERILE POLYISOPRENE POWDER-FREE SURGICAL GLOVES WITH EMOLLIENT COATING: Brand: PROTEXIS

## (undated) DEVICE — POOLE SUCTION INSTRUMENT WITH REMOVABLE SHEATH: Brand: POOLE

## (undated) DEVICE — PENCIL ES L3M BTTN SWCH S STL HEX LOK BLDE ELECTRD HOLSTER

## (undated) DEVICE — PACK PROCEDURE SURG C SECT KT SMH

## (undated) DEVICE — SUTURE VCRL SZ 0 L36IN ABSRB VLT L40MM CT 1/2 CIR J358H

## (undated) DEVICE — SUTURE VCRL 3-0 L36IN ABSRB VLT CT-1 L36MM 1/2 CIR J344H

## (undated) DEVICE — TIP CLEANER: Brand: VALLEYLAB

## (undated) DEVICE — STAPLER SKIN SQ 30 ABSRB STPL DISP INSORB

## (undated) DEVICE — REM POLYHESIVE ADULT PATIENT RETURN ELECTRODE: Brand: VALLEYLAB

## (undated) DEVICE — 3000CC GUARDIAN II: Brand: GUARDIAN

## (undated) DEVICE — SPONGE COUNTING BAG: Brand: DEVON

## (undated) DEVICE — (D)PREP SKN CHLRAPRP APPL 26ML -- CONVERT TO ITEM 371833

## (undated) DEVICE — DEVON™ KNEE AND BODY STRAP 60" X 3" (1.5 M X 7.6 CM): Brand: DEVON

## (undated) DEVICE — SUTURE MAXON SZ 0 L36IN ABSRB GRN SZ GS-21 L37MM 1/2 CIR 8886626961

## (undated) DEVICE — SUTURE MCRYL SZ 0 L36IN ABSRB VLT L48MM CTX 1/2 CIR Y398H

## (undated) DEVICE — LARGE, DISPOSABLE ALEXIS O C-SECTION PROTECTOR - RETRACTOR: Brand: ALEXIS ® O C-SECTION PROTECTOR - RETRACTOR

## (undated) DEVICE — ABDOMINAL PAD: Brand: DERMACEA

## (undated) DEVICE — STERILE POLYISOPRENE POWDER-FREE SURGICAL GLOVES: Brand: PROTEXIS

## (undated) DEVICE — SOLUTION IV 1000ML 0.9% SOD CHL

## (undated) DEVICE — (D)GLOVE SURG ORTH 6.5 PWD LTX -- DISC BY MFR USE ITEM 278012